# Patient Record
Sex: MALE | Race: WHITE | HISPANIC OR LATINO | Employment: FULL TIME | URBAN - METROPOLITAN AREA
[De-identification: names, ages, dates, MRNs, and addresses within clinical notes are randomized per-mention and may not be internally consistent; named-entity substitution may affect disease eponyms.]

---

## 2017-10-23 ENCOUNTER — TELEPHONE (OUTPATIENT)
Dept: MEDICAL GROUP | Facility: PHYSICIAN GROUP | Age: 47
End: 2017-10-23

## 2017-10-23 NOTE — TELEPHONE ENCOUNTER
ESTABLISHED PATIENT PRE-VISIT PLANNING     Note: Patient will not be contacted if there is no indication to call.     1.  Reviewed notes from the last few office visits within the medical group: Yes    2.  If any orders were placed at last visit or intended to be done for this visit (i.e. 6 mos follow-up), do we have Results/Consult Notes?        •  Labs - Labs ordered, NOT completed. Patient advised to complete prior to next appointment.   Note: If patient appointment is for lab review and patient did not complete labs,                check with provider if OK to reschedule patient until labs completed.       •  Imaging - Imaging was not ordered at last office visit.       •  Referrals - No referrals were ordered at last office visit.    3. Is this appointment scheduled as a Hospital Follow-Up? No    4.  Immunizations were updated in [facility] using WebIZ?: Yes       •  Web Iz Recommendations: FLU, MMR , TD and VARICELLA (Chicken Pox)     5.  Patient is due for the following Health Maintenance Topics:   Health Maintenance Due   Topic Date Due   • IMM INFLUENZA (1) 09/01/2017       - Patient has completed FLU and TDAP Immunization(s) per WebIZ. Chart has been updated.      6.  Patient was NOT informed to arrive 15 min prior to their scheduled appointment and bring in their medication bottles.

## 2017-10-24 ENCOUNTER — OFFICE VISIT (OUTPATIENT)
Dept: MEDICAL GROUP | Facility: PHYSICIAN GROUP | Age: 47
End: 2017-10-24
Payer: COMMERCIAL

## 2017-10-24 ENCOUNTER — HOSPITAL ENCOUNTER (OUTPATIENT)
Dept: LAB | Facility: MEDICAL CENTER | Age: 47
End: 2017-10-24
Attending: FAMILY MEDICINE
Payer: COMMERCIAL

## 2017-10-24 VITALS
TEMPERATURE: 97.8 F | WEIGHT: 230.16 LBS | HEART RATE: 54 BPM | BODY MASS INDEX: 32.95 KG/M2 | HEIGHT: 70 IN | DIASTOLIC BLOOD PRESSURE: 70 MMHG | SYSTOLIC BLOOD PRESSURE: 90 MMHG | OXYGEN SATURATION: 98 %

## 2017-10-24 DIAGNOSIS — E78.5 DYSLIPIDEMIA: ICD-10-CM

## 2017-10-24 DIAGNOSIS — R73.01 IMPAIRED FASTING BLOOD SUGAR: ICD-10-CM

## 2017-10-24 DIAGNOSIS — E66.9 OBESITY (BMI 30-39.9): ICD-10-CM

## 2017-10-24 DIAGNOSIS — Z00.00 ROUTINE PHYSICAL EXAMINATION: ICD-10-CM

## 2017-10-24 DIAGNOSIS — Z23 NEED FOR IMMUNIZATION AGAINST INFLUENZA: ICD-10-CM

## 2017-10-24 LAB
ALBUMIN SERPL BCP-MCNC: 4.2 G/DL (ref 3.2–4.9)
ALBUMIN/GLOB SERPL: 1.6 G/DL
ALP SERPL-CCNC: 50 U/L (ref 30–99)
ALT SERPL-CCNC: 19 U/L (ref 2–50)
ANION GAP SERPL CALC-SCNC: 7 MMOL/L (ref 0–11.9)
AST SERPL-CCNC: 18 U/L (ref 12–45)
BASOPHILS # BLD AUTO: 0.9 % (ref 0–1.8)
BASOPHILS # BLD: 0.06 K/UL (ref 0–0.12)
BILIRUB SERPL-MCNC: 0.6 MG/DL (ref 0.1–1.5)
BUN SERPL-MCNC: 15 MG/DL (ref 8–22)
CALCIUM SERPL-MCNC: 9.8 MG/DL (ref 8.5–10.5)
CHLORIDE SERPL-SCNC: 105 MMOL/L (ref 96–112)
CHOLEST SERPL-MCNC: 177 MG/DL (ref 100–199)
CO2 SERPL-SCNC: 26 MMOL/L (ref 20–33)
CREAT SERPL-MCNC: 0.98 MG/DL (ref 0.5–1.4)
EOSINOPHIL # BLD AUTO: 0.15 K/UL (ref 0–0.51)
EOSINOPHIL NFR BLD: 2.3 % (ref 0–6.9)
ERYTHROCYTE [DISTWIDTH] IN BLOOD BY AUTOMATED COUNT: 43 FL (ref 35.9–50)
EST. AVERAGE GLUCOSE BLD GHB EST-MCNC: 123 MG/DL
GFR SERPL CREATININE-BSD FRML MDRD: >60 ML/MIN/1.73 M 2
GLOBULIN SER CALC-MCNC: 2.7 G/DL (ref 1.9–3.5)
GLUCOSE SERPL-MCNC: 108 MG/DL (ref 65–99)
HBA1C MFR BLD: 5.9 % (ref 0–5.6)
HCT VFR BLD AUTO: 47 % (ref 42–52)
HDLC SERPL-MCNC: 38 MG/DL
HGB BLD-MCNC: 15.6 G/DL (ref 14–18)
IMM GRANULOCYTES # BLD AUTO: 0.03 K/UL (ref 0–0.11)
IMM GRANULOCYTES NFR BLD AUTO: 0.5 % (ref 0–0.9)
LDLC SERPL CALC-MCNC: 97 MG/DL
LYMPHOCYTES # BLD AUTO: 1.97 K/UL (ref 1–4.8)
LYMPHOCYTES NFR BLD: 29.7 % (ref 22–41)
MCH RBC QN AUTO: 29 PG (ref 27–33)
MCHC RBC AUTO-ENTMCNC: 33.2 G/DL (ref 33.7–35.3)
MCV RBC AUTO: 87.4 FL (ref 81.4–97.8)
MONOCYTES # BLD AUTO: 0.58 K/UL (ref 0–0.85)
MONOCYTES NFR BLD AUTO: 8.7 % (ref 0–13.4)
NEUTROPHILS # BLD AUTO: 3.84 K/UL (ref 1.82–7.42)
NEUTROPHILS NFR BLD: 57.9 % (ref 44–72)
NRBC # BLD AUTO: 0 K/UL
NRBC BLD AUTO-RTO: 0 /100 WBC
PLATELET # BLD AUTO: 292 K/UL (ref 164–446)
PMV BLD AUTO: 10.9 FL (ref 9–12.9)
POTASSIUM SERPL-SCNC: 4.3 MMOL/L (ref 3.6–5.5)
PROT SERPL-MCNC: 6.9 G/DL (ref 6–8.2)
RBC # BLD AUTO: 5.38 M/UL (ref 4.7–6.1)
SODIUM SERPL-SCNC: 138 MMOL/L (ref 135–145)
TRIGL SERPL-MCNC: 208 MG/DL (ref 0–149)
WBC # BLD AUTO: 6.6 K/UL (ref 4.8–10.8)

## 2017-10-24 PROCEDURE — 85025 COMPLETE CBC W/AUTO DIFF WBC: CPT

## 2017-10-24 PROCEDURE — 90471 IMMUNIZATION ADMIN: CPT | Performed by: FAMILY MEDICINE

## 2017-10-24 PROCEDURE — 80053 COMPREHEN METABOLIC PANEL: CPT

## 2017-10-24 PROCEDURE — 90686 IIV4 VACC NO PRSV 0.5 ML IM: CPT | Performed by: FAMILY MEDICINE

## 2017-10-24 PROCEDURE — 80061 LIPID PANEL: CPT

## 2017-10-24 PROCEDURE — 83036 HEMOGLOBIN GLYCOSYLATED A1C: CPT

## 2017-10-24 PROCEDURE — 99396 PREV VISIT EST AGE 40-64: CPT | Mod: 25 | Performed by: FAMILY MEDICINE

## 2017-10-24 PROCEDURE — 36415 COLL VENOUS BLD VENIPUNCTURE: CPT

## 2017-10-24 ASSESSMENT — PATIENT HEALTH QUESTIONNAIRE - PHQ9: CLINICAL INTERPRETATION OF PHQ2 SCORE: 0

## 2017-10-24 NOTE — PROGRESS NOTES
Subjective:      Elder Starks is a 47 y.o. male who presents with Annual Exam            HPI     Patient is here for annual physical exam. He already had a tdap in 9/16. He needs a flu shot today.    21 had his blood work done September 2016, he had borderline elevation of blood sugar and lipid panel came back triglycerides were slightly high at 212, HDL was slightly low at 37 and LDL at 83. He was supposed to have a follow-up blood work 3 months after that to see if this improved with his efforts which he has not done yet. Patient has gained weight about 5 pounds since last visit.    I reviewed the following    Past Medical History:   Diagnosis Date   • No significant past medical history         History reviewed. No pertinent surgical history.    No Known Allergies    Current Outpatient Prescriptions   Medication Sig Dispense Refill   • metronidazole (FLAGYL) 500 MG Tab Take 1 Tab by mouth 3 times a day. 30 Tab 0   • azithromycin (ZITHROMAX) 250 MG Tab Take 2 tablets by mouth on day one. Take one tablet by mouth the remaining days until gone 6 Tab 0   • cetirizine (ZYRTEC ALLERGY) 10 MG Tab Take 1 Tab by mouth every day. 30 Tab 0   • fluticasone (FLONASE) 50 MCG/ACT nasal spray Spray 1 Spray in nose every day. 16 g 0     No current facility-administered medications for this visit.         Family History   Problem Relation Age of Onset   • No Known Problems Mother    • Other Father    • No Known Problems Sister    • No Known Problems Brother    • No Known Problems Brother        Social History     Social History   • Marital status:      Spouse name: N/A   • Number of children: 2   • Years of education: N/A     Occupational History   • property management  Unknown     Social History Main Topics   • Smoking status: Former Smoker     Packs/day: 0.10     Years: 4.00     Types: Cigarettes   • Smokeless tobacco: Never Used      Comment: quit cigarettes at age 22   • Alcohol use 0.0 oz/week      Comment: occasional  "  • Drug use: No   • Sexual activity: Yes     Partners: Female     Other Topics Concern   • Not on file     Social History Narrative   • No narrative on file        ROS     Review of systems as per history of present illness, the rest are negative.       Objective:     BP (!) 90/70   Pulse (!) 54   Temp 36.6 °C (97.8 °F)   Ht 1.778 m (5' 10\")   Wt 104.4 kg (230 lb 2.6 oz)   SpO2 98%   BMI 33.02 kg/m²      Physical Exam   Constitutional: He is oriented to person, place, and time. He appears well-developed and well-nourished. No distress.   HENT:   Head: Normocephalic and atraumatic.   Right Ear: External ear normal.   Left Ear: External ear normal.   Nose: Nose normal.   Mouth/Throat: Oropharynx is clear and moist. No oropharyngeal exudate.   Eyes: Conjunctivae and EOM are normal. Pupils are equal, round, and reactive to light. Right eye exhibits no discharge. Left eye exhibits no discharge. No scleral icterus.   Neck: Normal range of motion. Neck supple. No JVD present. No tracheal deviation present. No thyromegaly present.   Cardiovascular: Normal rate, regular rhythm, normal heart sounds and intact distal pulses.  Exam reveals no gallop and no friction rub.    No murmur heard.  Pulmonary/Chest: Effort normal. No stridor. No respiratory distress. He has no wheezes. He has no rales. He exhibits no tenderness.   Abdominal: Soft. Bowel sounds are normal. He exhibits no distension and no mass. There is no tenderness. There is no rebound and no guarding. No hernia. Hernia confirmed negative in the right inguinal area.   Genitourinary: Testes normal and penis normal. Right testis shows no mass, no swelling and no tenderness. Right testis is descended. Left testis shows no mass, no swelling and no tenderness. Left testis is descended. Uncircumcised. No phimosis, paraphimosis, hypospadias, penile erythema or penile tenderness. No discharge found.   Musculoskeletal: Normal range of motion. He exhibits no edema or " tenderness.   Lymphadenopathy:     He has no cervical adenopathy. No inguinal adenopathy noted on the right side.   Neurological: He is alert and oriented to person, place, and time. He has normal reflexes. He displays normal reflexes. No cranial nerve deficit. He exhibits normal muscle tone. Coordination normal.   Skin: Skin is warm. No rash noted. He is not diaphoretic. No erythema. No pallor.   Psychiatric: He has a normal mood and affect. His behavior is normal. Judgment and thought content normal.                    Assessment/Plan:     1. Routine physical examination  Complete exam was done. Up-to-date with tdap. Flu shot was given.  - LIPID PROFILE; Future  - COMP METABOLIC PANEL; Future  - HEMOGLOBIN A1C; Future  - CBC WITH DIFFERENTIAL; Future    2. Impaired fasting blood sugar  We will do follow-up blood sugar including hemoglobin A1c. Advised to watch the sweets and decreased carbs, exercise regularly and lose weight.  - LIPID PROFILE; Future  - COMP METABOLIC PANEL; Future  - HEMOGLOBIN A1C; Future  - CBC WITH DIFFERENTIAL; Future    3. Dyslipidemia  We will do follow-up lipid panel. Advised to work on watching the carbohydrates and sweets and increasing the activity would regular exercises, losing weight.  - LIPID PROFILE; Future  - COMP METABOLIC PANEL; Future  - HEMOGLOBIN A1C; Future  - CBC WITH DIFFERENTIAL; Future    4. Obesity (BMI 30-39.9)  We discussed diet, exercise and weight loss.  - Patient identified as having weight management issue.  Appropriate orders and counseling given.  - LIPID PROFILE; Future  - COMP METABOLIC PANEL; Future  - HEMOGLOBIN A1C; Future  - CBC WITH DIFFERENTIAL; Future    5. Need for immunization against influenza  Flu shot was given.  - INFLUENZA VACCINE QUAD INJ >3Y(PF)      Please note that this dictation was created using voice recognition software. I have worked with consultants from the vendor as well as technical experts from [employer] to optimize the  interface. I have made every reasonable attempt to correct obvious errors, but I expect that there are errors of grammar and possibly content I did not discover before finalizing the note.

## 2017-10-26 ENCOUNTER — TELEPHONE (OUTPATIENT)
Dept: MEDICAL GROUP | Facility: PHYSICIAN GROUP | Age: 47
End: 2017-10-26

## 2017-10-26 NOTE — TELEPHONE ENCOUNTER
----- Message from Nova Gifford M.D. sent at 10/25/2017  7:05 PM PDT -----  No anemia. Blood sugar borderline elevated 108 and needs to be below 100. He has increased risk for diabetes so he needs to avoid sweets and decrease the carbohydrates, exercise regularly and maintain a healthy weight.  Liver and kidney functions are normal. Cholesterol panel came back triglycerides are high at 208. Triglycerides are from carbohydrates and sweets so he needs to avoid sweets and decreased carbs. HDL or good cholesterol is still low at 38. This needs to be 40 or higher. Exercise will make this better. Try to have cardio exercises 30 minutes a day for 5 days a week. LDL or bad cholesterol at target at 97. His 10 year risk of having heart disease is low at 0.6%. We treat with cholesterol medication if 7.5% or higher therefore we don't need to put him on medication. He just needs to work on the diet, exercise and weight loss.

## 2017-10-26 NOTE — TELEPHONE ENCOUNTER
Phone Number Called: [phone] (home) [phone] (work)    Message: Unable to reach pt left vm to call back.     Left Message for patient to call back: yes

## 2018-08-06 ENCOUNTER — HOSPITAL ENCOUNTER (OUTPATIENT)
Dept: RADIOLOGY | Facility: MEDICAL CENTER | Age: 48
End: 2018-08-06
Attending: FAMILY MEDICINE
Payer: COMMERCIAL

## 2018-08-06 ENCOUNTER — OFFICE VISIT (OUTPATIENT)
Dept: URGENT CARE | Facility: PHYSICIAN GROUP | Age: 48
End: 2018-08-06
Payer: COMMERCIAL

## 2018-08-06 VITALS
TEMPERATURE: 99.1 F | OXYGEN SATURATION: 97 % | WEIGHT: 232 LBS | SYSTOLIC BLOOD PRESSURE: 122 MMHG | BODY MASS INDEX: 32.48 KG/M2 | HEIGHT: 71 IN | DIASTOLIC BLOOD PRESSURE: 78 MMHG | HEART RATE: 93 BPM | RESPIRATION RATE: 16 BRPM

## 2018-08-06 DIAGNOSIS — S91.331A PUNCTURE WOUND OF RIGHT FOOT, INITIAL ENCOUNTER: ICD-10-CM

## 2018-08-06 DIAGNOSIS — S92.314B: ICD-10-CM

## 2018-08-06 PROCEDURE — 99213 OFFICE O/P EST LOW 20 MIN: CPT | Performed by: FAMILY MEDICINE

## 2018-08-06 PROCEDURE — 73630 X-RAY EXAM OF FOOT: CPT | Mod: RT

## 2018-08-06 RX ORDER — CEPHALEXIN 500 MG/1
500 CAPSULE ORAL 4 TIMES DAILY
Qty: 28 CAP | Refills: 0 | Status: SHIPPED | OUTPATIENT
Start: 2018-08-06 | End: 2018-08-13

## 2018-08-08 ASSESSMENT — ENCOUNTER SYMPTOMS: BRUISES/BLEEDS EASILY: 0

## 2018-08-08 NOTE — PROGRESS NOTES
"Subjective:      Elder Starks is a 48 y.o. male who presents with Foot Pain (pt shot himself with a nail gun today in his R foot)            Onset today puncture wound right foot.  He has had a 3 inch nail near his first MTP joint angled slightly backward midfoot.  Nail slightly breach the skin on the plantar aspect.  He pulled the nail out intact.  Nail went through a leather boot.  Pain is moderate.  No active bleeding.  No function or sensory loss.  He is not taking anything for pain.  It does hurt worse with weightbearing.  No other aggravating or alleviating factors.  Tetanus is up-to-date.        Review of Systems   Musculoskeletal: Negative for joint pain.   Skin: Negative for itching and rash.   Endo/Heme/Allergies: Does not bruise/bleed easily.          Objective:     /78   Pulse 93   Temp 37.3 °C (99.1 °F)   Resp 16   Ht 1.803 m (5' 11\")   Wt 105.2 kg (232 lb)   SpO2 97%   BMI 32.36 kg/m²      Physical Exam   Constitutional: He appears well-developed and well-nourished. No distress.   HENT:   Head: Normocephalic and atraumatic.   Musculoskeletal:        Feet:                Assessment/Plan:   X-ray right foot per radiology:  1.  Fracture in the lateral aspect of the first distal metatarsal. No definite extension to the first MTP joint is appreciated.    2.  Radiopaque density projecting lateral to the distal first metatarsal is consistent with a foreign body.    3.  Moderate degenerative change in the first MTP joint.    1. Puncture wound of right foot, initial encounter    - DX-FOOT-COMPLETE 3+ RIGHT; Future    2. Open nondisplaced fracture of first metatarsal bone of right foot, initial encounter    - cephALEXin (KEFLEX) 500 MG Cap; Take 1 Cap by mouth 4 times a day for 7 days.  Dispense: 28 Cap; Refill: 0  - REFERRAL TO ORTHOPEDICS    Differential diagnosis, natural history, supportive care, and indications for immediate follow-up discussed at length.     Case was discussed with on-call " orthopedist Dr. Avina.  He will likely leave the metallic foreign body in the foot.  He asked that we immobilize and have the patient follow-up in clinic with him the next day.  Keflex initiated.

## 2018-10-17 DIAGNOSIS — R73.01 IMPAIRED FASTING BLOOD SUGAR: ICD-10-CM

## 2018-10-17 DIAGNOSIS — E78.5 DYSLIPIDEMIA: ICD-10-CM

## 2018-10-27 ENCOUNTER — HOSPITAL ENCOUNTER (OUTPATIENT)
Dept: LAB | Facility: MEDICAL CENTER | Age: 48
End: 2018-10-27
Attending: FAMILY MEDICINE
Payer: COMMERCIAL

## 2018-10-27 DIAGNOSIS — E78.5 DYSLIPIDEMIA: ICD-10-CM

## 2018-10-27 DIAGNOSIS — R73.01 IMPAIRED FASTING BLOOD SUGAR: ICD-10-CM

## 2018-10-27 LAB
ALBUMIN SERPL BCP-MCNC: 4.5 G/DL (ref 3.2–4.9)
ALBUMIN/GLOB SERPL: 1.6 G/DL
ALP SERPL-CCNC: 54 U/L (ref 30–99)
ALT SERPL-CCNC: 21 U/L (ref 2–50)
ANION GAP SERPL CALC-SCNC: 7 MMOL/L (ref 0–11.9)
AST SERPL-CCNC: 21 U/L (ref 12–45)
BASOPHILS # BLD AUTO: 0.7 % (ref 0–1.8)
BASOPHILS # BLD: 0.04 K/UL (ref 0–0.12)
BILIRUB SERPL-MCNC: 0.5 MG/DL (ref 0.1–1.5)
BUN SERPL-MCNC: 18 MG/DL (ref 8–22)
CALCIUM SERPL-MCNC: 9.8 MG/DL (ref 8.5–10.5)
CHLORIDE SERPL-SCNC: 107 MMOL/L (ref 96–112)
CHOLEST SERPL-MCNC: 169 MG/DL (ref 100–199)
CO2 SERPL-SCNC: 26 MMOL/L (ref 20–33)
CREAT SERPL-MCNC: 1.14 MG/DL (ref 0.5–1.4)
EOSINOPHIL # BLD AUTO: 0.15 K/UL (ref 0–0.51)
EOSINOPHIL NFR BLD: 2.6 % (ref 0–6.9)
ERYTHROCYTE [DISTWIDTH] IN BLOOD BY AUTOMATED COUNT: 44.7 FL (ref 35.9–50)
EST. AVERAGE GLUCOSE BLD GHB EST-MCNC: 134 MG/DL
GLOBULIN SER CALC-MCNC: 2.9 G/DL (ref 1.9–3.5)
GLUCOSE SERPL-MCNC: 114 MG/DL (ref 65–99)
HBA1C MFR BLD: 6.3 % (ref 0–5.6)
HCT VFR BLD AUTO: 48.4 % (ref 42–52)
HDLC SERPL-MCNC: 35 MG/DL
HGB BLD-MCNC: 15.9 G/DL (ref 14–18)
IMM GRANULOCYTES # BLD AUTO: 0.04 K/UL (ref 0–0.11)
IMM GRANULOCYTES NFR BLD AUTO: 0.7 % (ref 0–0.9)
LDLC SERPL CALC-MCNC: 94 MG/DL
LYMPHOCYTES # BLD AUTO: 1.9 K/UL (ref 1–4.8)
LYMPHOCYTES NFR BLD: 32.6 % (ref 22–41)
MCH RBC QN AUTO: 28.6 PG (ref 27–33)
MCHC RBC AUTO-ENTMCNC: 32.9 G/DL (ref 33.7–35.3)
MCV RBC AUTO: 87.1 FL (ref 81.4–97.8)
MONOCYTES # BLD AUTO: 0.41 K/UL (ref 0–0.85)
MONOCYTES NFR BLD AUTO: 7 % (ref 0–13.4)
NEUTROPHILS # BLD AUTO: 3.29 K/UL (ref 1.82–7.42)
NEUTROPHILS NFR BLD: 56.4 % (ref 44–72)
NRBC # BLD AUTO: 0 K/UL
NRBC BLD-RTO: 0 /100 WBC
PLATELET # BLD AUTO: 279 K/UL (ref 164–446)
PMV BLD AUTO: 10.5 FL (ref 9–12.9)
POTASSIUM SERPL-SCNC: 4.2 MMOL/L (ref 3.6–5.5)
PROT SERPL-MCNC: 7.4 G/DL (ref 6–8.2)
RBC # BLD AUTO: 5.56 M/UL (ref 4.7–6.1)
SODIUM SERPL-SCNC: 140 MMOL/L (ref 135–145)
TRIGL SERPL-MCNC: 202 MG/DL (ref 0–149)
WBC # BLD AUTO: 5.8 K/UL (ref 4.8–10.8)

## 2018-10-27 PROCEDURE — 80061 LIPID PANEL: CPT

## 2018-10-27 PROCEDURE — 83036 HEMOGLOBIN GLYCOSYLATED A1C: CPT

## 2018-10-27 PROCEDURE — 85025 COMPLETE CBC W/AUTO DIFF WBC: CPT

## 2018-10-27 PROCEDURE — 36415 COLL VENOUS BLD VENIPUNCTURE: CPT

## 2018-10-27 PROCEDURE — 80053 COMPREHEN METABOLIC PANEL: CPT

## 2018-10-29 ENCOUNTER — OFFICE VISIT (OUTPATIENT)
Dept: MEDICAL GROUP | Facility: PHYSICIAN GROUP | Age: 48
End: 2018-10-29
Payer: COMMERCIAL

## 2018-10-29 VITALS
WEIGHT: 232.59 LBS | OXYGEN SATURATION: 96 % | DIASTOLIC BLOOD PRESSURE: 80 MMHG | HEART RATE: 60 BPM | HEIGHT: 70 IN | TEMPERATURE: 97.6 F | BODY MASS INDEX: 33.3 KG/M2 | SYSTOLIC BLOOD PRESSURE: 110 MMHG

## 2018-10-29 DIAGNOSIS — E78.5 DYSLIPIDEMIA: ICD-10-CM

## 2018-10-29 DIAGNOSIS — R73.01 IMPAIRED FASTING BLOOD SUGAR: ICD-10-CM

## 2018-10-29 DIAGNOSIS — Z00.00 ROUTINE PHYSICAL EXAMINATION: ICD-10-CM

## 2018-10-29 PROCEDURE — 99396 PREV VISIT EST AGE 40-64: CPT | Performed by: FAMILY MEDICINE

## 2018-10-29 ASSESSMENT — PATIENT HEALTH QUESTIONNAIRE - PHQ9: CLINICAL INTERPRETATION OF PHQ2 SCORE: 0

## 2018-10-29 NOTE — PROGRESS NOTES
"Subjective:      Elder Starks is a 48 y.o. male who presents with Annual Exam            HPI     Patient is here for annual physical exam.  He is accompanied by his wife.  He also needs follow-up of dyslipidemia and impaired fasting blood sugar.    He has been trying to manage his dyslipidemia and impaired fasting blood sugar with his own efforts but he is not good in watching his diet.  His weight increased 3 pounds from the last visit a year ago.  He is not exercising regularly.    I reviewed the following    Past Medical History:   Diagnosis Date   • No significant past medical history         History reviewed. No pertinent surgical history.    No Known Allergies    No current outpatient prescriptions on file.     No current facility-administered medications for this visit.         Family History   Problem Relation Age of Onset   • No Known Problems Mother    • Other Father    • No Known Problems Sister    • No Known Problems Brother    • No Known Problems Brother        Social History     Social History   • Marital status:      Spouse name: N/A   • Number of children: 2   • Years of education: N/A     Occupational History   • property management  Unknown     Social History Main Topics   • Smoking status: Former Smoker     Packs/day: 0.10     Years: 4.00     Types: Cigarettes   • Smokeless tobacco: Never Used      Comment: quit cigarettes at age 22   • Alcohol use 0.0 oz/week      Comment: occasional   • Drug use: No   • Sexual activity: Yes     Partners: Female     Other Topics Concern   • Not on file     Social History Narrative   • No narrative on file        ROS     As per HPI, the rest are negative.       Objective:     /80 (BP Location: Left arm, Patient Position: Sitting, BP Cuff Size: Adult)   Pulse 60   Temp 36.4 °C (97.6 °F) (Temporal)   Ht 1.778 m (5' 10\")   Wt 105.5 kg (232 lb 9.4 oz)   SpO2 96%   BMI 33.37 kg/m²      Physical Exam   Constitutional: He is oriented to person, place, and " time. He appears well-developed and well-nourished. No distress.   HENT:   Head: Normocephalic and atraumatic.   Right Ear: External ear normal.   Left Ear: External ear normal.   Nose: Nose normal.   Mouth/Throat: Oropharynx is clear and moist. No oropharyngeal exudate.   Eyes: Pupils are equal, round, and reactive to light. Conjunctivae and EOM are normal. Right eye exhibits no discharge. Left eye exhibits no discharge. No scleral icterus.   Neck: Normal range of motion. Neck supple. No JVD present. No tracheal deviation present. No thyromegaly present.   Cardiovascular: Normal rate, regular rhythm, normal heart sounds and intact distal pulses.  Exam reveals no gallop and no friction rub.    No murmur heard.  Pulmonary/Chest: Effort normal and breath sounds normal. No stridor. No respiratory distress. He has no wheezes. He has no rales. He exhibits no tenderness.   Abdominal: Soft. Bowel sounds are normal. He exhibits no distension and no mass. There is no tenderness. There is no rebound and no guarding. Hernia confirmed negative in the right inguinal area and confirmed negative in the left inguinal area.   Genitourinary: Testes normal and penis normal. Right testis shows no mass, no swelling and no tenderness. Right testis is descended. Left testis shows no mass, no swelling and no tenderness. Left testis is descended. Uncircumcised. No phimosis, paraphimosis, hypospadias, penile erythema or penile tenderness. No discharge found.   Musculoskeletal: Normal range of motion. He exhibits no edema or tenderness.   Lymphadenopathy:     He has no cervical adenopathy. No inguinal adenopathy noted on the right or left side.        Right: No inguinal adenopathy present.        Left: No inguinal adenopathy present.   Neurological: He is alert and oriented to person, place, and time. He displays normal reflexes. No cranial nerve deficit. He exhibits normal muscle tone. Coordination normal.   Skin: Skin is warm and dry. No  rash noted. He is not diaphoretic. No erythema. No pallor.   Psychiatric: He has a normal mood and affect. His behavior is normal. Judgment and thought content normal.   Nursing note and vitals reviewed.     Results for WYANE TURNER (MRN [id number]) as of 10/29/2018 08:11   Ref. Range 10/24/2017 08:07 10/27/2018 10:15   Sodium Latest Ref Range: 135 - 145 mmol/L 138 140   Potassium Latest Ref Range: 3.6 - 5.5 mmol/L 4.3 4.2   Chloride Latest Ref Range: 96 - 112 mmol/L 105 107   Co2 Latest Ref Range: 20 - 33 mmol/L 26 26   Anion Gap Latest Ref Range: 0.0 - 11.9  7.0 7.0   Glucose Latest Ref Range: 65 - 99 mg/dL 108 (H) 114 (H)   Bun Latest Ref Range: 8 - 22 mg/dL 15 18   Creatinine Latest Ref Range: 0.50 - 1.40 mg/dL 0.98 1.14   GFR If  Latest Ref Range: >60 mL/min/1.73 m 2 >60 >60   GFR If Non  Latest Ref Range: >60 mL/min/1.73 m 2 >60 >60   Calcium Latest Ref Range: 8.5 - 10.5 mg/dL 9.8 9.8   AST(SGOT) Latest Ref Range: 12 - 45 U/L 18 21   ALT(SGPT) Latest Ref Range: 2 - 50 U/L 19 21   Alkaline Phosphatase Latest Ref Range: 30 - 99 U/L 50 54   Total Bilirubin Latest Ref Range: 0.1 - 1.5 mg/dL 0.6 0.5   Albumin Latest Ref Range: 3.2 - 4.9 g/dL 4.2 4.5   Total Protein Latest Ref Range: 6.0 - 8.2 g/dL 6.9 7.4   Globulin Latest Ref Range: 1.9 - 3.5 g/dL 2.7 2.9   A-G Ratio Latest Units: g/dL 1.6 1.6   Glycohemoglobin Latest Ref Range: 0.0 - 5.6 % 5.9 (H) 6.3 (H)   Estim. Avg Glu Latest Units: mg/dL 123 134   Cholesterol,Tot Latest Ref Range: 100 - 199 mg/dL 177 169   Triglycerides Latest Ref Range: 0 - 149 mg/dL 208 (H) 202 (H)   HDL Latest Ref Range: >=40 mg/dL 38 (A) 35 (A)   LDL Latest Ref Range: <100 mg/dL 97 94     Results for WAYNE TURNER (MRN [id number]) as of 10/29/2018 08:11   Ref. Range 10/24/2017 08:07 10/27/2018 10:15   WBC Latest Ref Range: 4.8 - 10.8 K/uL 6.6 5.8   RBC Latest Ref Range: 4.70 - 6.10 M/uL 5.38 5.56   Hemoglobin Latest Ref Range: 14.0 - 18.0 g/dL 15.6 15.9   Hematocrit  Latest Ref Range: 42.0 - 52.0 % 47.0 48.4   MCV Latest Ref Range: 81.4 - 97.8 fL 87.4 87.1   MCH Latest Ref Range: 27.0 - 33.0 pg 29.0 28.6   MCHC Latest Ref Range: 33.7 - 35.3 g/dL 33.2 (L) 32.9 (L)   RDW Latest Ref Range: 35.9 - 50.0 fL 43.0 44.7   Platelet Count Latest Ref Range: 164 - 446 K/uL 292 279   MPV Latest Ref Range: 9.0 - 12.9 fL 10.9 10.5   Neutrophils-Polys Latest Ref Range: 44.00 - 72.00 % 57.90 56.40   Neutrophils (Absolute) Latest Ref Range: 1.82 - 7.42 K/uL 3.84 3.29   Lymphocytes Latest Ref Range: 22.00 - 41.00 % 29.70 32.60   Lymphs (Absolute) Latest Ref Range: 1.00 - 4.80 K/uL 1.97 1.90   Monocytes Latest Ref Range: 0.00 - 13.40 % 8.70 7.00   Monos (Absolute) Latest Ref Range: 0.00 - 0.85 K/uL 0.58 0.41   Eosinophils Latest Ref Range: 0.00 - 6.90 % 2.30 2.60   Eos (Absolute) Latest Ref Range: 0.00 - 0.51 K/uL 0.15 0.15   Basophils Latest Ref Range: 0.00 - 1.80 % 0.90 0.70   Baso (Absolute) Latest Ref Range: 0.00 - 0.12 K/uL 0.06 0.04   Immature Granulocytes Latest Ref Range: 0.00 - 0.90 % 0.50 0.70   Immature Granulocytes (abs) Latest Ref Range: 0.00 - 0.11 K/uL 0.03 0.04   Nucleated RBC Latest Units: /100 WBC 0.00 0.00   NRBC (Absolute) Latest Units: K/uL 0.00 0.00               Assessment/Plan:     1. Routine physical examination  Complete exam was done.  Up-to-date with Tdap which was given in 9/16.  We are out of the flu shot so he will get it from a local drugstore.    2. Impaired fasting blood sugar  Borderline elevation of blood sugar but nondiabetic level.  He will work hard on avoidance of sweets, decreasing the carbs, regular exercise and weight loss.  We will follow-up in 6 months.  - HEMOGLOBIN A1C; Future  - LIPID PROFILE; Future  - COMP METABOLIC PANEL; Future    3. Dyslipidemia  10-year ASCVD risk is 2.3%.  Triglycerides are still slightly elevated and HDL is still low below 40.  Advised to avoid sweets, decrease the carbs, exercise regularly.  Recheck in 6 months.  - HEMOGLOBIN  A1C; Future  - LIPID PROFILE; Future  - COMP METABOLIC PANEL; Future      Please note that this dictation was created using voice recognition software. I have worked with consultants from the vendor as well as technical experts from [facility] to optimize the interface. I have made every reasonable attempt to correct obvious errors, but I expect that there are errors of grammar and possibly content I did not discover before finalizing the note.

## 2019-07-18 ENCOUNTER — OFFICE VISIT (OUTPATIENT)
Dept: URGENT CARE | Facility: PHYSICIAN GROUP | Age: 49
End: 2019-07-18
Payer: COMMERCIAL

## 2019-07-18 ENCOUNTER — HOSPITAL ENCOUNTER (OUTPATIENT)
Dept: RADIOLOGY | Facility: MEDICAL CENTER | Age: 49
End: 2019-07-18
Attending: NURSE PRACTITIONER
Payer: COMMERCIAL

## 2019-07-18 VITALS
OXYGEN SATURATION: 95 % | DIASTOLIC BLOOD PRESSURE: 78 MMHG | WEIGHT: 229 LBS | TEMPERATURE: 98.6 F | HEART RATE: 90 BPM | HEIGHT: 70 IN | SYSTOLIC BLOOD PRESSURE: 104 MMHG | RESPIRATION RATE: 16 BRPM | BODY MASS INDEX: 32.78 KG/M2

## 2019-07-18 DIAGNOSIS — S39.012A STRAIN OF LUMBAR REGION, INITIAL ENCOUNTER: ICD-10-CM

## 2019-07-18 DIAGNOSIS — M54.50 ACUTE BILATERAL LOW BACK PAIN WITHOUT SCIATICA: ICD-10-CM

## 2019-07-18 PROCEDURE — 72100 X-RAY EXAM L-S SPINE 2/3 VWS: CPT

## 2019-07-18 PROCEDURE — 99214 OFFICE O/P EST MOD 30 MIN: CPT | Performed by: NURSE PRACTITIONER

## 2019-07-18 RX ORDER — IBUPROFEN 800 MG/1
800 TABLET ORAL EVERY 8 HOURS PRN
Qty: 30 TAB | Refills: 0 | Status: SHIPPED | OUTPATIENT
Start: 2019-07-18 | End: 2019-09-09

## 2019-07-18 RX ORDER — CYCLOBENZAPRINE HCL 10 MG
10 TABLET ORAL 3 TIMES DAILY PRN
Qty: 30 TAB | Refills: 0 | Status: SHIPPED | OUTPATIENT
Start: 2019-07-18 | End: 2019-10-30

## 2019-07-21 ASSESSMENT — ENCOUNTER SYMPTOMS
PARESIS: 0
NEUROLOGICAL NEGATIVE: 1
WEAKNESS: 0
BOWEL INCONTINENCE: 0
RESPIRATORY NEGATIVE: 1
NUMBNESS: 0
FOCAL WEAKNESS: 0
CONSTITUTIONAL NEGATIVE: 1
PARESTHESIAS: 0
BACK PAIN: 1
TINGLING: 0
SENSORY CHANGE: 0

## 2019-07-22 NOTE — PROGRESS NOTES
Subjective:     Elder Starks is a 49 y.o. male who presents for Back Pain (low back pain, x3 days)       Back Pain   This is a new problem. Episode onset: 3 days ago. The problem has been gradually worsening since onset. The pain is present in the lumbar spine. The pain is moderate. The symptoms are aggravated by bending. Pertinent negatives include no bladder incontinence, bowel incontinence, numbness, paresis, paresthesias, tingling or weakness. Risk factors: recent heavy lifting.     PMH:  has a past medical history of No significant past medical history. He also has no past medical history of Asthma or Type II or unspecified type diabetes mellitus without mention of complication, not stated as uncontrolled.    MEDS:   Current Outpatient Prescriptions:   •  cyclobenzaprine (FLEXERIL) 10 MG Tab, Take 1 Tab by mouth 3 times a day as needed for Muscle Spasms., Disp: 30 Tab, Rfl: 0  •  ibuprofen (MOTRIN) 800 MG Tab, Take 1 Tab by mouth every 8 hours as needed for Moderate Pain., Disp: 30 Tab, Rfl: 0    ALLERGIES: No Known Allergies    SURGHX: No past surgical history on file.    SOCHX:  reports that he has quit smoking. His smoking use included Cigarettes. He has a 0.40 pack-year smoking history. He has never used smokeless tobacco. He reports that he drinks alcohol. He reports that he does not use drugs.    FH: Reviewed with patient, not pertinent to this visit.    Review of Systems   Constitutional: Negative.  Negative for malaise/fatigue.   Respiratory: Negative.    Gastrointestinal: Negative for bowel incontinence.   Genitourinary: Negative for bladder incontinence.   Musculoskeletal: Positive for back pain.   Neurological: Negative.  Negative for tingling, sensory change, focal weakness, weakness, numbness and paresthesias.   All other systems reviewed and are negative.    Objective:     /78 (BP Location: Left arm, Patient Position: Sitting, BP Cuff Size: Large adult)   Pulse 90   Temp 37 °C (98.6 °F)  "(Temporal)   Resp 16   Ht 1.778 m (5' 10\")   Wt 103.9 kg (229 lb)   SpO2 95%   BMI 32.86 kg/m²     Physical Exam   Constitutional: He is oriented to person, place, and time. He appears well-developed and well-nourished.  Non-toxic appearance. No distress.   HENT:   Right Ear: External ear normal.   Left Ear: External ear normal.   Nose: Nose normal.   Eyes: Conjunctivae and EOM are normal.   Neck: Normal range of motion.   Cardiovascular: Normal rate and normal pulses.    Pulmonary/Chest: Effort normal. No respiratory distress.   Musculoskeletal: He exhibits no deformity.        Lumbar back: He exhibits decreased range of motion (limited by pain), tenderness, pain and spasm. He exhibits no swelling, no deformity and no laceration.   Neurological: He is alert and oriented to person, place, and time. Coordination normal.   Skin: Skin is warm and dry. Capillary refill takes less than 2 seconds.   Psychiatric: He has a normal mood and affect. His behavior is normal.     X-ray of lumbar spine:    Narrative     7/18/2019 7:35 PM    HISTORY/REASON FOR EXAM:  Pulled back muscle    TECHNIQUE/ EXAM DESCRIPTION AND NUMBER OF VIEWS:  3 views of the lumbar spine.    COMPARISON: None.    FINDINGS:  There are 5 nonrib-bearing lumbar vertebra. No compression fracture is identified. There is intervertebral disc space narrowing and endplate spurring at T12/L1 and L1/L2.. There is mild endplate spurring at L5 and L3/L4. There are mild degenerative   changes of the facet joints at L4/5 and at L5/S1. There is a moderate amount of colonic stool. SI joints are symmetric.     Impression     Degenerative changes as above described.    No fracture or subluxation is seen.    Moderate amount of colonic stool.     Reading Provider Reading Date   Tania Owens M.D.   Jul 18, 2019   Signing Provider Signing Date Signing Time   Tania Owens M.D. Jul 18, 2019  7:45 PM        Assessment/Plan:     1. Strain of lumbar region, initial " encounter  - cyclobenzaprine (FLEXERIL) 10 MG Tab; Take 1 Tab by mouth 3 times a day as needed for Muscle Spasms.  Dispense: 30 Tab; Refill: 0    2. Acute bilateral low back pain without sciatica  - DX-LUMBAR SPINE-2 OR 3 VIEWS; Future  - ibuprofen (MOTRIN) 800 MG Tab; Take 1 Tab by mouth every 8 hours as needed for Moderate Pain.  Dispense: 30 Tab; Refill: 0    X-ray of lumbar spine ordered. Radiology report and images reviewed by myself. Degenerative changes. Negative for acute process, no fracture or dislocation.    Advised on gentle massage, stretching, and ROM exercises. Rest and heat application. Rx sent for muscle relaxant and ibuprofen 800 mg tablets.    Advised close monitoring. Return precautions advised.    Patient advised to: Return for 1) Symptoms don't improve or worsen, or go to ER, 2) Follow up with primary care in 7-10 days.    Differential diagnosis, natural history, supportive care, and indications for immediate follow-up discussed. All questions answered. Patient agrees with the plan of care.

## 2019-08-12 ENCOUNTER — APPOINTMENT (OUTPATIENT)
Dept: MEDICAL GROUP | Facility: PHYSICIAN GROUP | Age: 49
End: 2019-08-12
Payer: COMMERCIAL

## 2019-09-09 ENCOUNTER — OFFICE VISIT (OUTPATIENT)
Dept: URGENT CARE | Facility: PHYSICIAN GROUP | Age: 49
End: 2019-09-09
Payer: COMMERCIAL

## 2019-09-09 ENCOUNTER — HOSPITAL ENCOUNTER (OUTPATIENT)
Dept: RADIOLOGY | Facility: MEDICAL CENTER | Age: 49
End: 2019-09-09
Attending: FAMILY MEDICINE
Payer: COMMERCIAL

## 2019-09-09 VITALS
HEART RATE: 68 BPM | OXYGEN SATURATION: 98 % | TEMPERATURE: 98.1 F | BODY MASS INDEX: 32.28 KG/M2 | SYSTOLIC BLOOD PRESSURE: 116 MMHG | WEIGHT: 225 LBS | RESPIRATION RATE: 16 BRPM | DIASTOLIC BLOOD PRESSURE: 82 MMHG

## 2019-09-09 DIAGNOSIS — S20.212A CONTUSION OF RIB ON LEFT SIDE, INITIAL ENCOUNTER: ICD-10-CM

## 2019-09-09 DIAGNOSIS — R07.9 CHEST PAIN, UNSPECIFIED TYPE: ICD-10-CM

## 2019-09-09 PROCEDURE — 99213 OFFICE O/P EST LOW 20 MIN: CPT | Performed by: FAMILY MEDICINE

## 2019-09-09 PROCEDURE — 71101 X-RAY EXAM UNILAT RIBS/CHEST: CPT | Mod: LT

## 2019-09-09 RX ORDER — MELOXICAM 15 MG/1
15 TABLET ORAL DAILY
Qty: 30 TAB | Refills: 0 | Status: SHIPPED | OUTPATIENT
Start: 2019-09-09 | End: 2019-10-30

## 2019-09-09 ASSESSMENT — ENCOUNTER SYMPTOMS
VERTIGO: 0
FEVER: 0
HEADACHES: 0

## 2019-09-10 NOTE — PROGRESS NOTES
Subjective:   Elder Starks is a 49 y.o. male who presents for Rib Pain (fell off ladder today, landded on left side)        Rib Injury   This is a new problem. The current episode started today. The problem occurs constantly. The problem has been unchanged. Pertinent negatives include no fever, headaches or vertigo.     Review of Systems   Constitutional: Negative for fever.   Neurological: Negative for vertigo and headaches.     No Known Allergies   Objective:   /82 (BP Location: Right arm, Patient Position: Sitting, BP Cuff Size: Adult)   Pulse 68   Temp 36.7 °C (98.1 °F) (Temporal)   Resp 16   Wt 102.1 kg (225 lb)   SpO2 98%   BMI 32.28 kg/m²   Physical Exam   Constitutional: He is oriented to person, place, and time. He appears well-developed and well-nourished. No distress.   HENT:   Head: Normocephalic and atraumatic.   Eyes: Pupils are equal, round, and reactive to light. Conjunctivae and EOM are normal.   Cardiovascular: Normal rate and regular rhythm.   No murmur heard.  Pulmonary/Chest: Effort normal and breath sounds normal. No respiratory distress. He exhibits tenderness. He exhibits no crepitus, no deformity and no retraction.   Abdominal: Soft. He exhibits no distension. There is no tenderness.   Neurological: He is alert and oriented to person, place, and time. He has normal reflexes. No sensory deficit.   Skin: Skin is warm and dry.   Psychiatric: He has a normal mood and affect.         Assessment/Plan:   1. Contusion of rib on left side, initial encounter  - [id number] (WITH 1-VIEW CXR) LEFT; Future  - meloxicam (MOBIC) 15 MG tablet; Take 1 Tab by mouth every day.  Dispense: 30 Tab; Refill: 0    Differential diagnosis, natural history, supportive care, and indications for immediate follow-up discussed.

## 2019-10-30 ENCOUNTER — OFFICE VISIT (OUTPATIENT)
Dept: MEDICAL GROUP | Facility: PHYSICIAN GROUP | Age: 49
End: 2019-10-30
Payer: COMMERCIAL

## 2019-10-30 ENCOUNTER — HOSPITAL ENCOUNTER (OUTPATIENT)
Dept: LAB | Facility: MEDICAL CENTER | Age: 49
End: 2019-10-30
Attending: FAMILY MEDICINE
Payer: COMMERCIAL

## 2019-10-30 VITALS
OXYGEN SATURATION: 98 % | WEIGHT: 232.37 LBS | HEIGHT: 70 IN | SYSTOLIC BLOOD PRESSURE: 110 MMHG | DIASTOLIC BLOOD PRESSURE: 60 MMHG | BODY MASS INDEX: 33.27 KG/M2 | TEMPERATURE: 97 F | HEART RATE: 56 BPM

## 2019-10-30 DIAGNOSIS — R73.01 IMPAIRED FASTING BLOOD SUGAR: ICD-10-CM

## 2019-10-30 DIAGNOSIS — Z23 NEED FOR IMMUNIZATION AGAINST INFLUENZA: ICD-10-CM

## 2019-10-30 DIAGNOSIS — E78.5 DYSLIPIDEMIA: ICD-10-CM

## 2019-10-30 DIAGNOSIS — Z00.00 ROUTINE PHYSICAL EXAMINATION: ICD-10-CM

## 2019-10-30 LAB
ALBUMIN SERPL BCP-MCNC: 4.5 G/DL (ref 3.2–4.9)
ALBUMIN/GLOB SERPL: 2 G/DL
ALP SERPL-CCNC: 70 U/L (ref 30–99)
ALT SERPL-CCNC: 16 U/L (ref 2–50)
ANION GAP SERPL CALC-SCNC: 7 MMOL/L (ref 0–11.9)
AST SERPL-CCNC: 15 U/L (ref 12–45)
BASOPHILS # BLD AUTO: 0.8 % (ref 0–1.8)
BASOPHILS # BLD: 0.05 K/UL (ref 0–0.12)
BILIRUB SERPL-MCNC: 0.6 MG/DL (ref 0.1–1.5)
BUN SERPL-MCNC: 18 MG/DL (ref 8–22)
CALCIUM SERPL-MCNC: 8.9 MG/DL (ref 8.5–10.5)
CHLORIDE SERPL-SCNC: 106 MMOL/L (ref 96–112)
CHOLEST SERPL-MCNC: 171 MG/DL (ref 100–199)
CO2 SERPL-SCNC: 27 MMOL/L (ref 20–33)
CREAT SERPL-MCNC: 1.06 MG/DL (ref 0.5–1.4)
EOSINOPHIL # BLD AUTO: 0.18 K/UL (ref 0–0.51)
EOSINOPHIL NFR BLD: 3.1 % (ref 0–6.9)
ERYTHROCYTE [DISTWIDTH] IN BLOOD BY AUTOMATED COUNT: 45.2 FL (ref 35.9–50)
EST. AVERAGE GLUCOSE BLD GHB EST-MCNC: 123 MG/DL
FASTING STATUS PATIENT QL REPORTED: NORMAL
GLOBULIN SER CALC-MCNC: 2.3 G/DL (ref 1.9–3.5)
GLUCOSE SERPL-MCNC: 113 MG/DL (ref 65–99)
HBA1C MFR BLD: 5.9 % (ref 0–5.6)
HCT VFR BLD AUTO: 47.2 % (ref 42–52)
HDLC SERPL-MCNC: 41 MG/DL
HGB BLD-MCNC: 15.1 G/DL (ref 14–18)
IMM GRANULOCYTES # BLD AUTO: 0.04 K/UL (ref 0–0.11)
IMM GRANULOCYTES NFR BLD AUTO: 0.7 % (ref 0–0.9)
LDLC SERPL CALC-MCNC: 95 MG/DL
LYMPHOCYTES # BLD AUTO: 1.64 K/UL (ref 1–4.8)
LYMPHOCYTES NFR BLD: 27.8 % (ref 22–41)
MCH RBC QN AUTO: 28.9 PG (ref 27–33)
MCHC RBC AUTO-ENTMCNC: 32 G/DL (ref 33.7–35.3)
MCV RBC AUTO: 90.4 FL (ref 81.4–97.8)
MONOCYTES # BLD AUTO: 0.5 K/UL (ref 0–0.85)
MONOCYTES NFR BLD AUTO: 8.5 % (ref 0–13.4)
NEUTROPHILS # BLD AUTO: 3.48 K/UL (ref 1.82–7.42)
NEUTROPHILS NFR BLD: 59.1 % (ref 44–72)
NRBC # BLD AUTO: 0 K/UL
NRBC BLD-RTO: 0 /100 WBC
PLATELET # BLD AUTO: 271 K/UL (ref 164–446)
PMV BLD AUTO: 11 FL (ref 9–12.9)
POTASSIUM SERPL-SCNC: 4.5 MMOL/L (ref 3.6–5.5)
PROT SERPL-MCNC: 6.8 G/DL (ref 6–8.2)
RBC # BLD AUTO: 5.22 M/UL (ref 4.7–6.1)
SODIUM SERPL-SCNC: 140 MMOL/L (ref 135–145)
TRIGL SERPL-MCNC: 177 MG/DL (ref 0–149)
WBC # BLD AUTO: 5.9 K/UL (ref 4.8–10.8)

## 2019-10-30 PROCEDURE — 90686 IIV4 VACC NO PRSV 0.5 ML IM: CPT | Performed by: FAMILY MEDICINE

## 2019-10-30 PROCEDURE — 83036 HEMOGLOBIN GLYCOSYLATED A1C: CPT

## 2019-10-30 PROCEDURE — 80053 COMPREHEN METABOLIC PANEL: CPT

## 2019-10-30 PROCEDURE — 80061 LIPID PANEL: CPT

## 2019-10-30 PROCEDURE — 36415 COLL VENOUS BLD VENIPUNCTURE: CPT

## 2019-10-30 PROCEDURE — 90471 IMMUNIZATION ADMIN: CPT | Performed by: FAMILY MEDICINE

## 2019-10-30 PROCEDURE — 85025 COMPLETE CBC W/AUTO DIFF WBC: CPT

## 2019-10-30 PROCEDURE — 99396 PREV VISIT EST AGE 40-64: CPT | Mod: 25 | Performed by: FAMILY MEDICINE

## 2019-10-30 SDOH — HEALTH STABILITY: MENTAL HEALTH: HOW OFTEN DO YOU HAVE 6 OR MORE DRINKS ON ONE OCCASION?: NEVER

## 2019-10-30 SDOH — HEALTH STABILITY: MENTAL HEALTH: HOW MANY STANDARD DRINKS CONTAINING ALCOHOL DO YOU HAVE ON A TYPICAL DAY?: 3 OR 4

## 2019-10-30 SDOH — HEALTH STABILITY: MENTAL HEALTH: HOW OFTEN DO YOU HAVE A DRINK CONTAINING ALCOHOL?: MONTHLY OR LESS

## 2019-10-30 ASSESSMENT — PATIENT HEALTH QUESTIONNAIRE - PHQ9: CLINICAL INTERPRETATION OF PHQ2 SCORE: 0

## 2019-10-30 NOTE — PROGRESS NOTES
Subjective:      Elder Starks is a 49 y.o. male who presents with Annual Exam        HPI:    The patient is here for annual exam and for follow up on his chronic medical problems. Patient would like to get his Influenza shot today. He reports feeling well and has no new medical complaints. No labs were completed prior to this visit but patient agrees to complete them after this visit.  Patient is up-to-date with Tdap which was given in 9/16.    Patient has history of impaired fasting blood sugar which he has been managing with his own efforts.  The last blood work was in October 2018 with hemoglobin A1c of 6.3%.    Patient also has history of anemia with the last lipid panel in October 2018 that came back triglycerides 202, HDL 35 and LDL 94.  Main problems are elevated triglycerides and low HDL LDL has always been below 100.  Managing this with his own efforts.  The last calculated 10-year ASCVD risk was 2.3%.    Wife has noticed a small bump on the upper back which is nonpainful.  This is been there for a few weeks.    I reviewed the following    Past Medical History:   Diagnosis Date   • No significant past medical history         History reviewed. No pertinent surgical history.    No Known Allergies    Current Outpatient Medications   Medication Sig Dispense Refill   • meloxicam (MOBIC) 15 MG tablet Take 1 Tab by mouth every day. (Patient not taking: Reported on 10/30/2019) 30 Tab 0   • cyclobenzaprine (FLEXERIL) 10 MG Tab Take 1 Tab by mouth 3 times a day as needed for Muscle Spasms. (Patient not taking: Reported on 9/9/2019) 30 Tab 0     No current facility-administered medications for this visit.         Family History   Problem Relation Age of Onset   • No Known Problems Mother    • Other Father         BPH   • No Known Problems Sister    • No Known Problems Brother    • No Known Problems Brother        Social History     Socioeconomic History   • Marital status:      Spouse name: Not on file   • Number  "of children: 2   • Years of education: Not on file   • Highest education level: Not on file   Occupational History   • Occupation: [employer] - [employer]     Employer: unknown   Social Needs   • Financial resource strain: Not on file   • Food insecurity:     Worry: Not on file     Inability: Not on file   • Transportation needs:     Medical: Not on file     Non-medical: Not on file   Tobacco Use   • Smoking status: Former Smoker     Packs/day: 0.10     Years: 4.00     Pack years: 0.40     Types: Cigarettes   • Smokeless tobacco: Never Used   • Tobacco comment: quit cigarettes at age 22   Substance and Sexual Activity   • Alcohol use: Yes     Alcohol/week: 0.0 oz     Frequency: Monthly or less     Drinks per session: 3 or 4     Binge frequency: Never     Comment: occasional   • Drug use: No   • Sexual activity: Yes     Partners: Female   Lifestyle   • Physical activity:     Days per week: Not on file     Minutes per session: Not on file   • Stress: Not on file   Relationships   • Social connections:     Talks on phone: Not on file     Gets together: Not on file     Attends Judaism service: Not on file     Active member of club or organization: Not on file     Attends meetings of clubs or organizations: Not on file     Relationship status: Not on file   • Intimate partner violence:     Fear of current or ex partner: Not on file     Emotionally abused: Not on file     Physically abused: Not on file     Forced sexual activity: Not on file   Other Topics Concern   • Not on file   Social History Narrative   • Not on file       ROS:  As per the HPI as shown above, the rest are negative.       Objective:     /60 (BP Location: Left arm, Patient Position: Sitting, BP Cuff Size: Adult)   Pulse (!) 56   Temp 36.1 °C (97 °F) (Temporal)   Ht 1.778 m (5' 10\")   Wt 105.4 kg (232 lb 5.8 oz)   SpO2 98%   BMI 33.34 kg/m²     Physical Exam   Constitutional: He is oriented to person, place, and time. " He appears well-developed and well-nourished. No distress.   HENT:   Head: Normocephalic and atraumatic.   Right Ear: External ear normal.   Left Ear: External ear normal.   Nose: Nose normal.   Mouth/Throat: Oropharynx is clear and moist. No oropharyngeal exudate.   Eyes: Pupils are equal, round, and reactive to light. Conjunctivae and EOM are normal. Right eye exhibits no discharge. Left eye exhibits no discharge. No scleral icterus.   Neck: Normal range of motion. Neck supple. No JVD present. No tracheal deviation present. No thyromegaly present.   Cardiovascular: Normal rate, regular rhythm, normal heart sounds and intact distal pulses. Exam reveals no gallop and no friction rub.   No murmur heard.  Pulmonary/Chest: Effort normal and breath sounds normal. No stridor. No respiratory distress. He has no wheezes. He has no rales. He exhibits no tenderness.   Abdominal: Soft. Bowel sounds are normal. He exhibits no distension and no mass. There is no tenderness. There is no rebound and no guarding. No hernia. Hernia confirmed negative in the right inguinal area and confirmed negative in the left inguinal area.   Genitourinary: Testes normal and penis normal. Right testis shows no mass, no swelling and no tenderness. Right testis is descended. Left testis shows no mass, no swelling and no tenderness. Left testis is descended. Uncircumcised. No phimosis, paraphimosis, hypospadias, penile erythema or penile tenderness. No discharge found.   Musculoskeletal: Normal range of motion. He exhibits no edema, tenderness or deformity.   Lymphadenopathy:     He has no cervical adenopathy. No inguinal adenopathy noted on the right or left side.        Right: No inguinal adenopathy present.        Left: No inguinal adenopathy present.   Neurological: He is alert and oriented to person, place, and time. He has normal reflexes. He displays normal reflexes. No cranial nerve deficit. He exhibits normal muscle tone. Coordination  normal.   Skin: Skin is warm and dry. No rash noted. He is not diaphoretic. No erythema. No pallor.   7 mm nodule midline of upper thoacic area consitent with black head   Psychiatric: He has a normal mood and affect. His behavior is normal. Judgment and thought content normal.   Nursing note and vitals reviewed.       Assessment/Plan:     1. Routine physical examination  Complete exam was done. Patient will get a flu shot today. He is up to date with Tdap.  - Lipid Profile; Future  - Comp Metabolic Panel; Future  - HEMOGLOBIN A1C; Future  - CBC WITH DIFFERENTIAL; Future    2. Impaired fasting blood sugar  Borderline elevation of blood sugar but nondiabetic level.  He will work hard on avoidance of sweets, decreasing the carbs, regular exercise and weight loss. He will complete blood work following his visit and I will communicate the results to him through [facility].  - Lipid Profile; Future  - Comp Metabolic Panel; Future  - HEMOGLOBIN A1C; Future  - CBC WITH DIFFERENTIAL; Future    3. Dyslipidemia  10-year ASCVD risk is 2.3%. Last year his triglycerides were slightly elevated and HDL was low below 40 but LDL was at goal. Advised to avoid sweets, decrease the carbs, exercise regularly. He will complete blood work following his visit and I will communicate the results to him through [facility].  - Lipid Profile; Future  - Comp Metabolic Panel; Future  - HEMOGLOBIN A1C; Future  - CBC WITH DIFFERENTIAL; Future    4. Need for immunization against influenza  Influenza vaccine given today without adverse effects.   - Influenza Vaccine Quad Injection (PF)        Shona MULLINS (Scribe), am scribing for, and in the presence of, Nova Gifford MD     Electronically signed by: Shona Howell ([place]), 10/30/2019    Nova MULLINS MD personally performed the services described in this documentation, as scribed by Shona Howell in my presence, and it is both accurate and complete.

## 2020-05-16 ENCOUNTER — OFFICE VISIT (OUTPATIENT)
Dept: URGENT CARE | Facility: PHYSICIAN GROUP | Age: 50
End: 2020-05-16
Payer: COMMERCIAL

## 2020-05-16 VITALS
DIASTOLIC BLOOD PRESSURE: 78 MMHG | RESPIRATION RATE: 18 BRPM | WEIGHT: 227 LBS | BODY MASS INDEX: 31.78 KG/M2 | TEMPERATURE: 97.9 F | SYSTOLIC BLOOD PRESSURE: 110 MMHG | HEART RATE: 79 BPM | OXYGEN SATURATION: 100 % | HEIGHT: 71 IN

## 2020-05-16 DIAGNOSIS — S01.01XA LACERATION OF SCALP, INITIAL ENCOUNTER: ICD-10-CM

## 2020-05-16 PROCEDURE — 12002 RPR S/N/AX/GEN/TRNK2.6-7.5CM: CPT | Performed by: FAMILY MEDICINE

## 2020-05-16 ASSESSMENT — FIBROSIS 4 INDEX: FIB4 SCORE: 0.66

## 2020-05-16 NOTE — PROGRESS NOTES
"Subjective:      Elder Starks is a 49 y.o. male who presents with Laceration (pt states he had abranch fall on his head today )            This is a new problem.  41-year-old patient otherwise healthy presenting for evaluation of scalp laceration that happened today.  He was cutting a branch at his home in the branch bent and hit his top of the head.  He denies any headache or loss of consciousness.  He denies any numbness or tingling in the upper or lower extremity muscle weakness or change in vision.  He is not on any medication.  He has not taken any over-the-counter medication.  No other injuries.  Immunizations are up-to-date.  Patient reports that his wife cleaned the wound after it happened with water and hydrogen peroxide.      ROS       Objective:     /78 (BP Location: Left arm, Patient Position: Sitting, BP Cuff Size: Adult)   Pulse 79   Temp 36.6 °C (97.9 °F) (Temporal)   Resp 18   Ht 1.803 m (5' 11\")   Wt 103 kg (227 lb)   SpO2 100%   BMI 31.66 kg/m²      Physical Exam  Constitutional:       General: He is not in acute distress.     Appearance: He is not ill-appearing, toxic-appearing or diaphoretic.   HENT:      Right Ear: External ear normal.      Left Ear: External ear normal.      Nose: Nose normal.      Mouth/Throat:      Mouth: Mucous membranes are moist.      Pharynx: Oropharynx is clear. No oropharyngeal exudate or posterior oropharyngeal erythema.   Eyes:      Extraocular Movements: Extraocular movements intact.      Conjunctiva/sclera: Conjunctivae normal.      Pupils: Pupils are equal, round, and reactive to light.   Cardiovascular:      Rate and Rhythm: Normal rate.   Pulmonary:      Effort: Pulmonary effort is normal. No respiratory distress.      Breath sounds: No stridor.   Skin:     Coloration: Skin is not jaundiced or pale.             Comments: About 2.5 cm scalp laceration on the outlined area.  No active bleeding.   Neurological:      Mental Status: He is alert and oriented " to person, place, and time.      Cranial Nerves: Cranial nerves are intact.      Sensory: Sensation is intact.      Motor: Motor function is intact.   Psychiatric:         Mood and Affect: Mood normal.                 Assessment/Plan:   ASSESSMENT:PLAN:  1. Laceration of scalp, initial encounter      Wound management options were discussed with the patient and/or parent and they agree to proceed.     Anesthesia: none  Irrigation/Prep: Wound was cleansed with normal saline excluded/removed.  Closure: 4 staples placed and patient tolerated procedure well.  Topical antibiotic applied  Home care, signs/symptoms of infection and suture removal guidelines were discussed with patient/parent.   staple removal in 7 days  Wound recheck in couple of days.  Plan as outlined above and per patient instruction  Warning signs reviewed.

## 2020-05-16 NOTE — PATIENT INSTRUCTIONS
Laceration Care, Adult  A laceration is a cut that goes through all layers of the skin. The cut also goes into the tissue that is right under the skin. Some cuts heal on their own. Others need to be closed with stitches (sutures), staples, skin adhesive strips, or wound glue. Taking care of your cut lowers your risk of infection and helps your cut to heal better.  HOW TO TAKE CARE OF YOUR CUT  For stitches or staples:  · Keep the wound clean and dry.  · If you were given a bandage (dressing), you should change it at least one time per day or as told by your doctor. You should also change it if it gets wet or dirty.  · Keep the wound completely dry for the first 24 hours or as told by your doctor. After that time, you may take a shower or a bath. However, make sure that the wound is not soaked in water until after the stitches or staples have been removed.  · Clean the wound one time each day or as told by your doctor:  ¨ Wash the wound with soap and water.  ¨ Rinse the wound with water until all of the soap comes off.  ¨ Pat the wound dry with a clean towel. Do not rub the wound.  · After you clean the wound, put a thin layer of antibiotic ointment on it as told by your doctor. This ointment:  ¨ Helps to prevent infection.  ¨ Keeps the bandage from sticking to the wound.  · Have your stitches or staples removed as told by your doctor.  If your doctor used skin adhesive strips:   · Keep the wound clean and dry.  · If you were given a bandage, you should change it at least one time per day or as told by your doctor. You should also change it if it gets dirty or wet.  · Do not get the skin adhesive strips wet. You can take a shower or a bath, but be careful to keep the wound dry.  · If the wound gets wet, pat it dry with a clean towel. Do not rub the wound.  · Skin adhesive strips fall off on their own. You can trim the strips as the wound heals. Do not remove any strips that are still stuck to the wound. They will  fall off after a while.  If your doctor used wound glue:  · Try to keep your wound dry, but you may briefly wet it in the shower or bath. Do not soak the wound in water, such as by swimming.  · After you take a shower or a bath, gently pat the wound dry with a clean towel. Do not rub the wound.  · Do not do any activities that will make you really sweaty until the skin glue has fallen off on its own.  · Do not apply liquid, cream, or ointment medicine to your wound while the skin glue is still on.  · If you were given a bandage, you should change it at least one time per day or as told by your doctor. You should also change it if it gets dirty or wet.  · If a bandage is placed over the wound, do not let the tape for the bandage touch the skin glue.  · Do not pick at the glue. The skin glue usually stays on for 5-10 days. Then, it falls off of the skin.  General Instructions   · To help prevent scarring, make sure to cover your wound with sunscreen whenever you are outside after stitches are removed, after adhesive strips are removed, or when wound glue stays in place and the wound is healed. Make sure to wear a sunscreen of at least 30 SPF.  · Take over-the-counter and prescription medicines only as told by your doctor.  · If you were given antibiotic medicine or ointment, take or apply it as told by your doctor. Do not stop using the antibiotic even if your wound is getting better.  · Do not scratch or pick at the wound.  · Keep all follow-up visits as told by your doctor. This is important.  · Check your wound every day for signs of infection. Watch for:  ¨ Redness, swelling, or pain.  ¨ Fluid, blood, or pus.  · Raise (elevate) the injured area above the level of your heart while you are sitting or lying down, if possible.  GET HELP IF:  · You got a tetanus shot and you have any of these problems at the injection site:  ¨ Swelling.  ¨ Very bad pain.  ¨ Redness.  ¨ Bleeding.  · You have a fever.  · A wound that was  closed breaks open.  · You notice a bad smell coming from your wound or your bandage.  · You notice something coming out of the wound, such as wood or glass.  · Medicine does not help your pain.  · You have more redness, swelling, or pain at the site of your wound.  · You have fluid, blood, or pus coming from your wound.  · You notice a change in the color of your skin near your wound.  · You need to change the bandage often because fluid, blood, or pus is coming from the wound.  · You start to have a new rash.  · You start to have numbness around the wound.  GET HELP RIGHT AWAY IF:  · You have very bad swelling around the wound.  · Your pain suddenly gets worse and is very bad.  · You notice painful lumps near the wound or on skin that is anywhere on your body.  · You have a red streak going away from your wound.  · The wound is on your hand or foot and you cannot move a finger or toe like you usually can.  · The wound is on your hand or foot and you notice that your fingers or toes look pale or bluish.     This information is not intended to replace advice given to you by your health care provider. Make sure you discuss any questions you have with your health care provider.     Document Released: 06/05/2009 Document Revised: 05/03/2016 Document Reviewed: 12/14/2015  [employer] Interactive Patient Education ©2016 [employer]

## 2020-05-18 ENCOUNTER — OFFICE VISIT (OUTPATIENT)
Dept: URGENT CARE | Facility: PHYSICIAN GROUP | Age: 50
End: 2020-05-18
Payer: COMMERCIAL

## 2020-05-18 VITALS
HEART RATE: 73 BPM | TEMPERATURE: 98 F | DIASTOLIC BLOOD PRESSURE: 78 MMHG | RESPIRATION RATE: 12 BRPM | SYSTOLIC BLOOD PRESSURE: 110 MMHG | WEIGHT: 227 LBS | HEIGHT: 71 IN | OXYGEN SATURATION: 98 % | BODY MASS INDEX: 31.78 KG/M2

## 2020-05-18 DIAGNOSIS — Z51.89 VISIT FOR WOUND CHECK: ICD-10-CM

## 2020-05-18 PROCEDURE — 99024 POSTOP FOLLOW-UP VISIT: CPT | Performed by: PHYSICIAN ASSISTANT

## 2020-05-18 ASSESSMENT — FIBROSIS 4 INDEX: FIB4 SCORE: 0.66

## 2020-05-18 ASSESSMENT — ENCOUNTER SYMPTOMS
FEVER: 0
HEADACHES: 0
VOMITING: 0
NAUSEA: 0
CHILLS: 0

## 2020-05-19 NOTE — PROGRESS NOTES
"Subjective:      Elder Starks is a 49 y.o. male who presents with Wound Check (Scalp Lac, no issues  x3days )            Wound Check   He was originally treated 2 to 3 days ago (2 days ago). Previous treatment included laceration repair. Maximum temperature: no fever. There has been no drainage from the wound. The redness has improved. The swelling has improved. The pain has improved.     4 staples placed in scalp.     Past Medical History:   Diagnosis Date   • No significant past medical history        No past surgical history on file.    Family History   Problem Relation Age of Onset   • No Known Problems Mother    • Other Father         BPH   • No Known Problems Sister    • No Known Problems Brother    • No Known Problems Brother        No Known Allergies    Medications, Allergies, and current problem list reviewed today in Epic    Review of Systems   Constitutional: Negative for chills, fever and malaise/fatigue.   Gastrointestinal: Negative for nausea and vomiting.   Skin:        Laceration scalp- 4 staples placed   Neurological: Negative for headaches.     All other systems reviewed and are negative.        Objective:     /78   Pulse 73   Temp 36.7 °C (98 °F) (Temporal)   Resp 12   Ht 1.803 m (5' 11\")   Wt 103 kg (227 lb)   SpO2 98%   BMI 31.66 kg/m²      Physical Exam  Constitutional:       General: He is not in acute distress.  Cardiovascular:      Rate and Rhythm: Normal rate.   Pulmonary:      Effort: Pulmonary effort is normal.   Skin:     General: Skin is warm and dry.      Comments: Scalp with curved laceration. 4 staples in place without wound dehiscence. No surrounding erythema or edema. NTTP. No purulent drainage.   Neurological:      General: No focal deficit present.      Mental Status: He is alert and oriented to person, place, and time.   Psychiatric:         Mood and Affect: Mood normal.         Behavior: Behavior normal.         Thought Content: Thought content normal.         " Judgment: Judgment normal.                 Assessment/Plan:       1. Visit for wound check  Healing well  Continue wound care  RTC in 5 days for staple removal.      Differential diagnoses, Supportive care, and indications for immediate follow-up discussed with patient.   Pathogenesis of diagnosis discussed including typical length and natural progression.   Instructed to return to clinic or nearest emergency department for any change in condition, further concerns, or worsening of symptoms.    The patient demonstrated a good understanding and agreed with the treatment plan.    Mary Beth Valenzuela P.A.-C.

## 2020-05-23 ENCOUNTER — OFFICE VISIT (OUTPATIENT)
Dept: URGENT CARE | Facility: PHYSICIAN GROUP | Age: 50
End: 2020-05-23
Payer: COMMERCIAL

## 2020-05-23 VITALS
SYSTOLIC BLOOD PRESSURE: 114 MMHG | BODY MASS INDEX: 31.78 KG/M2 | WEIGHT: 227 LBS | TEMPERATURE: 97 F | HEART RATE: 74 BPM | OXYGEN SATURATION: 96 % | RESPIRATION RATE: 16 BRPM | DIASTOLIC BLOOD PRESSURE: 86 MMHG | HEIGHT: 71 IN

## 2020-05-23 DIAGNOSIS — Z48.02 ENCOUNTER FOR STAPLE REMOVAL: ICD-10-CM

## 2020-05-23 ASSESSMENT — FIBROSIS 4 INDEX: FIB4 SCORE: 0.66

## 2020-05-23 NOTE — PROGRESS NOTES
Subjective:      Elder Starks is a 49 y.o. male who presents with Suture / Staple Removal (remove staples from head)    Vitals:    05/23/20 1513   BP: 114/86   Pulse: 74   Resp: 16   Temp: 36.1 °C (97 °F)   SpO2: 96%         HPI:   Presents for staple removal 7 days post procedure. Laceration without pain, discharge or redness.     ROS:   no fever, no sensory loss, no weakness    Exam:   Gen: WDWN in no distress  Wound: well healing laceration. 4 staples removed without difficulty. No evidence of dehiscence or infection.  Neuro: sensory/motor intact     A/P   Laceration  Staple Removal  F/U prn

## 2020-10-22 ENCOUNTER — OFFICE VISIT (OUTPATIENT)
Dept: MEDICAL GROUP | Facility: PHYSICIAN GROUP | Age: 50
End: 2020-10-22
Payer: COMMERCIAL

## 2020-10-22 ENCOUNTER — HOSPITAL ENCOUNTER (OUTPATIENT)
Dept: LAB | Facility: MEDICAL CENTER | Age: 50
End: 2020-10-22
Attending: FAMILY MEDICINE
Payer: COMMERCIAL

## 2020-10-22 VITALS
OXYGEN SATURATION: 99 % | DIASTOLIC BLOOD PRESSURE: 70 MMHG | SYSTOLIC BLOOD PRESSURE: 120 MMHG | BODY MASS INDEX: 32.53 KG/M2 | HEIGHT: 71 IN | WEIGHT: 232.37 LBS | HEART RATE: 63 BPM | TEMPERATURE: 97.2 F

## 2020-10-22 DIAGNOSIS — E78.5 DYSLIPIDEMIA: ICD-10-CM

## 2020-10-22 DIAGNOSIS — Z00.00 ROUTINE PHYSICAL EXAMINATION: ICD-10-CM

## 2020-10-22 DIAGNOSIS — Z23 NEED FOR IMMUNIZATION AGAINST INFLUENZA: ICD-10-CM

## 2020-10-22 DIAGNOSIS — R73.01 IMPAIRED FASTING BLOOD SUGAR: ICD-10-CM

## 2020-10-22 LAB
ALBUMIN SERPL BCP-MCNC: 4.2 G/DL (ref 3.2–4.9)
ALBUMIN/GLOB SERPL: 1.7 G/DL
ALP SERPL-CCNC: 64 U/L (ref 30–99)
ALT SERPL-CCNC: 17 U/L (ref 2–50)
ANION GAP SERPL CALC-SCNC: 7 MMOL/L (ref 7–16)
AST SERPL-CCNC: 15 U/L (ref 12–45)
BASOPHILS # BLD AUTO: 0.8 % (ref 0–1.8)
BASOPHILS # BLD: 0.05 K/UL (ref 0–0.12)
BILIRUB SERPL-MCNC: 0.4 MG/DL (ref 0.1–1.5)
BUN SERPL-MCNC: 16 MG/DL (ref 8–22)
CALCIUM SERPL-MCNC: 8.8 MG/DL (ref 8.5–10.5)
CHLORIDE SERPL-SCNC: 105 MMOL/L (ref 96–112)
CHOLEST SERPL-MCNC: 151 MG/DL (ref 100–199)
CO2 SERPL-SCNC: 27 MMOL/L (ref 20–33)
CREAT SERPL-MCNC: 0.94 MG/DL (ref 0.5–1.4)
EOSINOPHIL # BLD AUTO: 0.15 K/UL (ref 0–0.51)
EOSINOPHIL NFR BLD: 2.4 % (ref 0–6.9)
ERYTHROCYTE [DISTWIDTH] IN BLOOD BY AUTOMATED COUNT: 45.6 FL (ref 35.9–50)
EST. AVERAGE GLUCOSE BLD GHB EST-MCNC: 143 MG/DL
FASTING STATUS PATIENT QL REPORTED: NORMAL
GLOBULIN SER CALC-MCNC: 2.5 G/DL (ref 1.9–3.5)
GLUCOSE SERPL-MCNC: 110 MG/DL (ref 65–99)
HBA1C MFR BLD: 6.6 % (ref 0–5.6)
HCT VFR BLD AUTO: 45.6 % (ref 42–52)
HDLC SERPL-MCNC: 38 MG/DL
HGB BLD-MCNC: 14.6 G/DL (ref 14–18)
IMM GRANULOCYTES # BLD AUTO: 0.04 K/UL (ref 0–0.11)
IMM GRANULOCYTES NFR BLD AUTO: 0.6 % (ref 0–0.9)
LDLC SERPL CALC-MCNC: 96 MG/DL
LYMPHOCYTES # BLD AUTO: 1.68 K/UL (ref 1–4.8)
LYMPHOCYTES NFR BLD: 26.7 % (ref 22–41)
MCH RBC QN AUTO: 28.3 PG (ref 27–33)
MCHC RBC AUTO-ENTMCNC: 32 G/DL (ref 33.7–35.3)
MCV RBC AUTO: 88.4 FL (ref 81.4–97.8)
MONOCYTES # BLD AUTO: 0.49 K/UL (ref 0–0.85)
MONOCYTES NFR BLD AUTO: 7.8 % (ref 0–13.4)
NEUTROPHILS # BLD AUTO: 3.89 K/UL (ref 1.82–7.42)
NEUTROPHILS NFR BLD: 61.7 % (ref 44–72)
NRBC # BLD AUTO: 0 K/UL
NRBC BLD-RTO: 0 /100 WBC
PLATELET # BLD AUTO: 308 K/UL (ref 164–446)
PMV BLD AUTO: 10.8 FL (ref 9–12.9)
POTASSIUM SERPL-SCNC: 4.5 MMOL/L (ref 3.6–5.5)
PROT SERPL-MCNC: 6.7 G/DL (ref 6–8.2)
RBC # BLD AUTO: 5.16 M/UL (ref 4.7–6.1)
SODIUM SERPL-SCNC: 139 MMOL/L (ref 135–145)
TRIGL SERPL-MCNC: 84 MG/DL (ref 0–149)
WBC # BLD AUTO: 6.3 K/UL (ref 4.8–10.8)

## 2020-10-22 PROCEDURE — 90471 IMMUNIZATION ADMIN: CPT | Performed by: FAMILY MEDICINE

## 2020-10-22 PROCEDURE — 80061 LIPID PANEL: CPT

## 2020-10-22 PROCEDURE — 85025 COMPLETE CBC W/AUTO DIFF WBC: CPT

## 2020-10-22 PROCEDURE — 90686 IIV4 VACC NO PRSV 0.5 ML IM: CPT | Performed by: FAMILY MEDICINE

## 2020-10-22 PROCEDURE — 99396 PREV VISIT EST AGE 40-64: CPT | Mod: 25 | Performed by: FAMILY MEDICINE

## 2020-10-22 PROCEDURE — 83036 HEMOGLOBIN GLYCOSYLATED A1C: CPT

## 2020-10-22 PROCEDURE — 80053 COMPREHEN METABOLIC PANEL: CPT

## 2020-10-22 PROCEDURE — 36415 COLL VENOUS BLD VENIPUNCTURE: CPT

## 2020-10-22 ASSESSMENT — FIBROSIS 4 INDEX: FIB4 SCORE: 0.68

## 2020-10-22 ASSESSMENT — PATIENT HEALTH QUESTIONNAIRE - PHQ9: CLINICAL INTERPRETATION OF PHQ2 SCORE: 0

## 2020-10-22 NOTE — PROGRESS NOTES
Subjective:      Elder Starks is a 50 y.o. male who presents with Annual Exam            HPI     Patient is here for annual physical exam.  Is up-to-date with Tdap with the last one in 2016.  Needs flu shot today.    He said he has been having on and off knee pain when he has been on his knees squatting or kneeling at work or when he stands up after sitting for a long time.  Denies any redness or swelling of the knees.  Denies any trauma.    We follow him for impaired fasting blood sugar and he manages this by watching his diet.  He said he has avoided sugary drinks and desserts but he has not been good in watching the carbs for the last hemoglobin A1c was 5.9 with fasting blood glucose of 113 in October 2019.    We follow him for elevated triglycerides.  As mentioned above he has been watching his diet better than before.  He however gained 5 pounds since last visit.    I reviewed the following    Past medical history  Impaired fasting blood sugar  Dyslipidemia    History reviewed. No pertinent surgical history.    No Known Allergies    No current outpatient medications on file.     No current facility-administered medications for this visit.         Family History   Problem Relation Age of Onset   • No Known Problems Mother    • Other Father         BPH   • No Known Problems Sister    • No Known Problems Brother    • No Known Problems Brother        Social History     Socioeconomic History   • Marital status:      Spouse name: Not on file   • Number of children: 2   • Years of education: Not on file   • Highest education level: Not on file   Occupational History   • Occupation: [employer] - [employer]     Employer: unknown   Social Needs   • Financial resource strain: Not on file   • Food insecurity     Worry: Not on file     Inability: Not on file   • Transportation needs     Medical: Not on file     Non-medical: Not on file   Tobacco Use   • Smoking status: Former Smoker     Packs/day:  "0.10     Years: 4.00     Pack years: 0.40     Types: Cigarettes   • Smokeless tobacco: Never Used   • Tobacco comment: quit cigarettes at age 22   Substance and Sexual Activity   • Alcohol use: Yes     Alcohol/week: 0.0 oz     Frequency: Monthly or less     Drinks per session: 3 or 4     Binge frequency: Never     Comment: occasional   • Drug use: No   • Sexual activity: Yes     Partners: Female   Lifestyle   • Physical activity     Days per week: Not on file     Minutes per session: Not on file   • Stress: Not on file   Relationships   • Social connections     Talks on phone: Not on file     Gets together: Not on file     Attends Rastafari service: Not on file     Active member of club or organization: Not on file     Attends meetings of clubs or organizations: Not on file     Relationship status: Not on file   • Intimate partner violence     Fear of current or ex partner: Not on file     Emotionally abused: Not on file     Physically abused: Not on file     Forced sexual activity: Not on file   Other Topics Concern   • Not on file   Social History Narrative   • Not on file        ROS   As per HPI, the rest are negative.       Objective:     /70 (BP Location: Left arm, Patient Position: Sitting, BP Cuff Size: Adult)   Pulse 63   Temp 36.2 °C (97.2 °F) (Temporal)   Ht 1.803 m (5' 11\")   Wt 105.4 kg (232 lb 5.8 oz)   SpO2 99%   BMI 32.41 kg/m²      Physical Exam  Vitals signs and nursing note reviewed. Exam conducted with a chaperone present.   Constitutional:       General: He is not in acute distress.     Appearance: He is well-developed. He is not diaphoretic.   HENT:      Head: Normocephalic and atraumatic.      Right Ear: External ear normal.      Left Ear: External ear normal.      Nose: Nose normal.      Mouth/Throat:      Pharynx: No oropharyngeal exudate.   Eyes:      General: No scleral icterus.        Right eye: No discharge.         Left eye: No discharge.      Conjunctiva/sclera: Conjunctivae " normal.      Pupils: Pupils are equal, round, and reactive to light.   Neck:      Musculoskeletal: Normal range of motion and neck supple.      Thyroid: No thyromegaly.      Vascular: No JVD.      Trachea: No tracheal deviation.   Cardiovascular:      Rate and Rhythm: Normal rate and regular rhythm.      Heart sounds: Normal heart sounds. No murmur. No friction rub. No gallop.    Pulmonary:      Effort: Pulmonary effort is normal. No respiratory distress.      Breath sounds: Normal breath sounds. No stridor. No wheezing or rales.   Chest:      Chest wall: No tenderness.   Abdominal:      General: Bowel sounds are normal. There is no distension.      Palpations: Abdomen is soft. There is no mass.      Tenderness: There is no abdominal tenderness. There is no guarding or rebound.      Hernia: There is no hernia in the left inguinal area or right inguinal area.   Genitourinary:     Penis: Normal and uncircumcised. No phimosis, erythema, tenderness or discharge.       Scrotum/Testes: Normal.         Right: Mass, tenderness or swelling not present. Right testis is descended.         Left: Mass, tenderness or swelling not present. Left testis is descended.      Epididymis:      Right: Normal.      Left: Normal.   Musculoskeletal: Normal range of motion.         General: No tenderness.   Lymphadenopathy:      Cervical: No cervical adenopathy.      Lower Body: No right inguinal adenopathy. No left inguinal adenopathy.   Skin:     General: Skin is warm and dry.      Coloration: Skin is not pale.      Findings: No erythema or rash.   Neurological:      Mental Status: He is alert and oriented to person, place, and time.      Cranial Nerves: No cranial nerve deficit.      Motor: No abnormal muscle tone.      Coordination: Coordination normal.      Deep Tendon Reflexes: Reflexes normal.   Psychiatric:         Behavior: Behavior normal.         Thought Content: Thought content normal.         Judgment: Judgment normal.                       Assessment/Plan:        1. Routine physical examination  Complete exam done.  Up-to-date with Tdap.  Flu shot was given today.    2. Impaired fasting blood sugar  We will do updated blood work.  Continue to avoid sweets and advised to decrease the carbs.  Advised regular exercises and weight loss.  - Lipid Profile; Future  - Comp Metabolic Panel; Future  - HEMOGLOBIN A1C; Future  - CBC WITH DIFFERENTIAL; Future    3. Dyslipidemia  We will do updated lipid panel.  - Lipid Profile; Future  - Comp Metabolic Panel; Future  - HEMOGLOBIN A1C; Future  - CBC WITH DIFFERENTIAL; Future    4. Need for immunization against influenza  Flu shot was given.  - Influenza Vaccine Quad Injection (PF)    5.  Bilateral knee pain  No abnormal findings on exam.  Most likely problem with the tracking of the patella.  Advised to stand up and stretch every hour if he has been squatting or kneeling or sitting for long time.      Please note that this dictation was created using voice recognition software. I have worked with consultants from the vendor as well as technical experts from [facility]  [employer] to optimize the interface. I have made every reasonable attempt to correct obvious errors, but I expect that there are errors of grammar and possibly content I did not discover before finalizing the note.

## 2020-10-23 DIAGNOSIS — R73.01 IMPAIRED FASTING BLOOD SUGAR: ICD-10-CM

## 2020-10-23 DIAGNOSIS — E78.5 DYSLIPIDEMIA: ICD-10-CM

## 2021-01-28 ENCOUNTER — HOSPITAL ENCOUNTER (OUTPATIENT)
Dept: RADIOLOGY | Facility: MEDICAL CENTER | Age: 51
End: 2021-01-28
Attending: FAMILY MEDICINE
Payer: COMMERCIAL

## 2021-01-28 ENCOUNTER — OFFICE VISIT (OUTPATIENT)
Dept: MEDICAL GROUP | Facility: PHYSICIAN GROUP | Age: 51
End: 2021-01-28
Payer: COMMERCIAL

## 2021-01-28 VITALS
WEIGHT: 234.13 LBS | SYSTOLIC BLOOD PRESSURE: 100 MMHG | HEART RATE: 64 BPM | TEMPERATURE: 97.7 F | BODY MASS INDEX: 32.78 KG/M2 | OXYGEN SATURATION: 97 % | HEIGHT: 71 IN | DIASTOLIC BLOOD PRESSURE: 80 MMHG

## 2021-01-28 DIAGNOSIS — M79.672 LEFT FOOT PAIN: ICD-10-CM

## 2021-01-28 PROCEDURE — 73630 X-RAY EXAM OF FOOT: CPT | Mod: LT

## 2021-01-28 PROCEDURE — 99214 OFFICE O/P EST MOD 30 MIN: CPT | Performed by: FAMILY MEDICINE

## 2021-01-28 RX ORDER — MELOXICAM 15 MG/1
15 TABLET ORAL DAILY
Qty: 30 TAB | Refills: 1 | Status: SHIPPED | OUTPATIENT
Start: 2021-01-28 | End: 2023-01-25

## 2021-01-28 ASSESSMENT — FIBROSIS 4 INDEX: FIB4 SCORE: 0.58

## 2021-01-28 ASSESSMENT — PATIENT HEALTH QUESTIONNAIRE - PHQ9: CLINICAL INTERPRETATION OF PHQ2 SCORE: 0

## 2021-01-29 NOTE — PROGRESS NOTES
"Subjective:      Elder Starks is a 50 y.o. male who presents with Foot Injury (right)            HPI     Patient is here complaining of 3-week duration of left foot pain.  Denies any trauma.  Pain is worse when he first gets up to walk after sitting for a while or when he gets out of bed.  The pain gets better when he has been up and about for a while.  He has not been taking any medication for the pain.  Denies history of gout.    Past medical history, past surgical history, family history reviewed-no changes    Social history reviewed-no changes    Allergies reviewed-no changes    Medications reviewed-no changes    ROS     As per HPI, the rest are negative.       Objective:     /80 (BP Location: Left arm, Patient Position: Sitting, BP Cuff Size: Adult)   Pulse 64   Temp 36.5 °C (97.7 °F) (Temporal)   Ht 1.803 m (5' 11\")   Wt 106 kg (234 lb 2.1 oz)   SpO2 97%   BMI 32.65 kg/m²      Physical Exam     Examined alert, awake, oriented, not in distress    Left foot-strong pedal pulse, there is slight swelling in the medial aspect of the arch just below the medial malleolus with pinpoint tenderness in this area, no limitation of range of movement, no erythema or warmth or skin changes          Assessment/Plan:        1. Left foot pain  We will get x-ray of the foot.  Warm compresses 15 minutes 3 times a day.  Meloxicam 15 mg 1 tablet daily with food as needed.  I will get back with him with results.  Consider referral to podiatrist depending on results.  Advised wearing gel arch support.  - DX-FOOT-COMPLETE 3+ LEFT; Future  - meloxicam (MOBIC) 15 MG tablet; Take 1 Tab by mouth every day.  Dispense: 30 Tab; Refill: 1      Please note that this dictation was created using voice recognition software. I have worked with consultants from the vendor as well as technical experts from [employer] to optimize the interface. I have made every reasonable attempt to correct obvious errors, but I expect that there are " errors of grammar and possibly content I did not discover before finalizing the note.

## 2021-02-03 DIAGNOSIS — M79.672 LEFT FOOT PAIN: ICD-10-CM

## 2021-02-17 ENCOUNTER — HOSPITAL ENCOUNTER (OUTPATIENT)
Dept: LAB | Facility: MEDICAL CENTER | Age: 51
End: 2021-02-17
Attending: FAMILY MEDICINE
Payer: COMMERCIAL

## 2021-02-17 DIAGNOSIS — E78.5 DYSLIPIDEMIA: ICD-10-CM

## 2021-02-17 DIAGNOSIS — R73.01 IMPAIRED FASTING BLOOD SUGAR: ICD-10-CM

## 2021-02-17 LAB
ALBUMIN SERPL BCP-MCNC: 4.1 G/DL (ref 3.2–4.9)
ALBUMIN/GLOB SERPL: 1.4 G/DL
ALP SERPL-CCNC: 73 U/L (ref 30–99)
ALT SERPL-CCNC: 19 U/L (ref 2–50)
ANION GAP SERPL CALC-SCNC: 7 MMOL/L (ref 7–16)
AST SERPL-CCNC: 18 U/L (ref 12–45)
BILIRUB SERPL-MCNC: 0.3 MG/DL (ref 0.1–1.5)
BUN SERPL-MCNC: 19 MG/DL (ref 8–22)
CALCIUM SERPL-MCNC: 9.2 MG/DL (ref 8.5–10.5)
CHLORIDE SERPL-SCNC: 105 MMOL/L (ref 96–112)
CHOLEST SERPL-MCNC: 167 MG/DL (ref 100–199)
CO2 SERPL-SCNC: 25 MMOL/L (ref 20–33)
CREAT SERPL-MCNC: 1 MG/DL (ref 0.5–1.4)
CREAT UR-MCNC: 162.3 MG/DL
EST. AVERAGE GLUCOSE BLD GHB EST-MCNC: 146 MG/DL
FASTING STATUS PATIENT QL REPORTED: NORMAL
GLOBULIN SER CALC-MCNC: 3 G/DL (ref 1.9–3.5)
GLUCOSE SERPL-MCNC: 116 MG/DL (ref 65–99)
HBA1C MFR BLD: 6.7 % (ref 0–5.6)
HDLC SERPL-MCNC: 40 MG/DL
LDLC SERPL CALC-MCNC: 108 MG/DL
MICROALBUMIN UR-MCNC: <1.2 MG/DL
MICROALBUMIN/CREAT UR: NORMAL MG/G (ref 0–30)
POTASSIUM SERPL-SCNC: 4.9 MMOL/L (ref 3.6–5.5)
PROT SERPL-MCNC: 7.1 G/DL (ref 6–8.2)
SODIUM SERPL-SCNC: 137 MMOL/L (ref 135–145)
TRIGL SERPL-MCNC: 97 MG/DL (ref 0–149)
URATE SERPL-MCNC: 6.2 MG/DL (ref 2.5–8.3)

## 2021-02-17 PROCEDURE — 84550 ASSAY OF BLOOD/URIC ACID: CPT

## 2021-02-17 PROCEDURE — 80061 LIPID PANEL: CPT

## 2021-02-17 PROCEDURE — 80053 COMPREHEN METABOLIC PANEL: CPT

## 2021-02-17 PROCEDURE — 82570 ASSAY OF URINE CREATININE: CPT

## 2021-02-17 PROCEDURE — 36415 COLL VENOUS BLD VENIPUNCTURE: CPT

## 2021-02-17 PROCEDURE — 83036 HEMOGLOBIN GLYCOSYLATED A1C: CPT

## 2021-02-17 PROCEDURE — 82043 UR ALBUMIN QUANTITATIVE: CPT

## 2021-02-22 ENCOUNTER — OFFICE VISIT (OUTPATIENT)
Dept: MEDICAL GROUP | Facility: PHYSICIAN GROUP | Age: 51
End: 2021-02-22
Payer: COMMERCIAL

## 2021-02-22 VITALS
HEIGHT: 71 IN | DIASTOLIC BLOOD PRESSURE: 70 MMHG | BODY MASS INDEX: 32.25 KG/M2 | HEART RATE: 78 BPM | TEMPERATURE: 98.2 F | OXYGEN SATURATION: 95 % | WEIGHT: 230.38 LBS | SYSTOLIC BLOOD PRESSURE: 110 MMHG

## 2021-02-22 DIAGNOSIS — E78.5 DYSLIPIDEMIA: ICD-10-CM

## 2021-02-22 DIAGNOSIS — E11.9 DIABETES MELLITUS WITHOUT COMPLICATION (HCC): ICD-10-CM

## 2021-02-22 DIAGNOSIS — M79.672 LEFT FOOT PAIN: ICD-10-CM

## 2021-02-22 PROCEDURE — 99214 OFFICE O/P EST MOD 30 MIN: CPT | Performed by: FAMILY MEDICINE

## 2021-02-22 ASSESSMENT — FIBROSIS 4 INDEX: FIB4 SCORE: 0.66

## 2021-02-23 RX ORDER — INDOMETHACIN 50 MG/1
CAPSULE ORAL
COMMUNITY
Start: 2021-02-10 | End: 2023-01-25

## 2021-02-23 NOTE — PROGRESS NOTES
"Subjective:      Elder Starks is a 50 y.o. male who presents with Results (labs)            HPI     Patient is here for follow-up of his medical problems.    We have been following him for impaired fasting blood sugar and he has been managing this with his own efforts only.  His hemoglobin A1c has increased from 5.9-6.6 in October 2020 which was already in the diabetic level.  I did not diagnose him yet with diabetes as this was the first time that the hemoglobin A1c increased to diabetic level.  He said he has avoided tortillas and has switched to brown rice.  Blood work was done before this visit.    In terms of dyslipidemia, patient continues to manage this with his own efforts only.    I saw him for left foot pain about 3 weeks ago.  His pain was at the proximal arch just below the medial malleolus.  There was no trauma.  I sent him for x-ray which did not show any acute findings and showed mild degenerative changes in the talonavicular joint and the first MTP joint.  His pain worsened with conservative measures with difficulty bearing weight and so I sent him to the [facility] and he was seen and evaluated by Dr. Angulo.  He was sent for uric acid level which came back at 6.2.  Initially I put him on meloxicam which was changed by Dr. Angulo to a different anti-inflammatory which he said he takes 3 times a day and has been helping.  He was also referred to physical therapy which he has not started yet.    Past medical history, past surgical history, family history reviewed-no changes    Social history reviewed-no changes    Allergies reviewed-no changes    Medications reviewed-no changes        ROS     As per HPI, the rest are negative.       Objective:     /70 (BP Location: Left arm, Patient Position: Sitting, BP Cuff Size: Adult)   Pulse 78   Temp 36.8 °C (98.2 °F) (Temporal)   Ht 1.803 m (5' 11\")   Wt 104 kg (230 lb 6.1 oz)   SpO2 95%   BMI 32.13 kg/m²      Physical Exam "     Examined alert, awake, oriented, not in distress    Neck-supple, no lymphadenopathy, no thyromegaly  Lungs-clear to auscultation, no rales, no wheezes  Heart-regular rate and rhythm, no murmur  Extremities-no edema, clubbing, cyanosis     Hospital Outpatient Visit on 02/17/2021   Component Date Value Ref Range Status   • Creatinine, Urine 02/17/2021 162.30  mg/dL Final   • Microalbumin, Urine Random 02/17/2021 <1.2  mg/dL Final   • Micro Alb Creat Ratio 02/17/2021 see below  0 - 30 mg/g Final    Comment: Unable to calculate the microalbumin/creatinine ratio due to  the microalbumin result or the urine creatinine result being  outside the measurement range of the analyzer.     • Glycohemoglobin 02/17/2021 6.7* 0.0 - 5.6 % Final    Comment: Increased risk for diabetes:  5.7 -6.4%  Diabetes:  >6.4%  Glycemic control for adults with diabetes:  <7.0%  The above interpretations are per ADA guidelines.  Diagnosis  of diabetes mellitus on the basis of elevated Hemoglobin A1c  should be confirmed by repeating the Hb A1c test.     • Est Avg Glucose 02/17/2021 146  mg/dL Final    Comment: The eAG calculation is based on the A1c-Derived Daily Glucose  (ADAG) study.  See the ADA's website for additional information.     • Sodium 02/17/2021 137  135 - 145 mmol/L Final   • Potassium 02/17/2021 4.9  3.6 - 5.5 mmol/L Final   • Chloride 02/17/2021 105  96 - 112 mmol/L Final   • Co2 02/17/2021 25  20 - 33 mmol/L Final   • Anion Gap 02/17/2021 7.0  7.0 - 16.0 Final   • Glucose 02/17/2021 116* 65 - 99 mg/dL Final   • Bun 02/17/2021 19  8 - 22 mg/dL Final   • Creatinine 02/17/2021 1.00  0.50 - 1.40 mg/dL Final   • Calcium 02/17/2021 9.2  8.5 - 10.5 mg/dL Final   • AST(SGOT) 02/17/2021 18  12 - 45 U/L Final   • ALT(SGPT) 02/17/2021 19  2 - 50 U/L Final   • Alkaline Phosphatase 02/17/2021 73  30 - 99 U/L Final   • Total Bilirubin 02/17/2021 0.3  0.1 - 1.5 mg/dL Final   • Albumin 02/17/2021 4.1  3.2 - 4.9 g/dL Final   • Total Protein  02/17/2021 7.1  6.0 - 8.2 g/dL Final   • Globulin 02/17/2021 3.0  1.9 - 3.5 g/dL Final   • A-G Ratio 02/17/2021 1.4  g/dL Final   • Cholesterol,Tot 02/17/2021 167  100 - 199 mg/dL Final   • Triglycerides 02/17/2021 97  0 - 149 mg/dL Final   • HDL 02/17/2021 40  >=40 mg/dL Final   • LDL 02/17/2021 108* <100 mg/dL Final   • Fasting Status 02/17/2021 Fasting   Final   • GFR If  02/17/2021 >60  >60 mL/min/1.73 m 2 Final   • GFR If Non  02/17/2021 >60  >60 mL/min/1.73 m 2 Final          Assessment/Plan:        1. Diabetes mellitus without complication (HCC)  Hemoglobin A1c is higher than before at 6.7 consistent with diabetes.  To hemoglobin A1c results are already diabetic level.  This is however not out of control so we do not need to treat with medication.  He will work hard on avoidance of sweets, decreasing carbs, regular exercise and weight loss.  I told him if the hemoglobin A1c goes up to 7.0 or higher then we will start medication.  I will reevaluate in 3 months.  - Lipid Profile; Future  - Comp Metabolic Panel; Future  - HEMOGLOBIN A1C; Future    2. Dyslipidemia  The LDL cholesterol has increased from 96-1 08.  HDL improved from 38-40.  The goal for the LDL cholesterol should be below 100 because of the diabetes.  I recommended starting statin but he wants to wait until next visit.  He will work hard on avoidance of fatty foods, eating more vegetables, eating fish 3 times a week, regular exercise and weight loss and recheck blood work next visit.  We will start statin if there is no improvement.  - Lipid Profile; Future  - Comp Metabolic Panel; Future  - HEMOGLOBIN A1C; Future    3. Left foot pain  He has been seen and evaluated by the orthopedist.  The uric acid level was not consistent with gout.  He was started on a different anti-inflammatory which he could not specify which is helping better than the meloxicam.  Advised to continue taking the anti-inflammatory and he will  soon start physical therapy as recommended by the orthopedist.  We will follow along.    Follow-up in 3 months.      Please note that this dictation was created using voice recognition software. I have worked with consultants from the vendor as well as technical experts from [facility]  [employer] to optimize the interface. I have made every reasonable attempt to correct obvious errors, but I expect that there are errors of grammar and possibly content I did not discover before finalizing the note.

## 2021-05-24 DIAGNOSIS — M79.672 LEFT FOOT PAIN: ICD-10-CM

## 2021-06-03 ENCOUNTER — TELEPHONE (OUTPATIENT)
Dept: MEDICAL GROUP | Facility: PHYSICIAN GROUP | Age: 51
End: 2021-06-03

## 2021-06-22 ENCOUNTER — HOSPITAL ENCOUNTER (OUTPATIENT)
Dept: LAB | Facility: MEDICAL CENTER | Age: 51
End: 2021-06-22
Attending: FAMILY MEDICINE
Payer: COMMERCIAL

## 2021-06-22 DIAGNOSIS — E11.9 DIABETES MELLITUS WITHOUT COMPLICATION (HCC): ICD-10-CM

## 2021-06-22 DIAGNOSIS — E78.5 DYSLIPIDEMIA: ICD-10-CM

## 2021-06-22 LAB
ALBUMIN SERPL BCP-MCNC: 4.1 G/DL (ref 3.2–4.9)
ALBUMIN/GLOB SERPL: 1.4 G/DL
ALP SERPL-CCNC: 72 U/L (ref 30–99)
ALT SERPL-CCNC: 16 U/L (ref 2–50)
ANION GAP SERPL CALC-SCNC: 9 MMOL/L (ref 7–16)
AST SERPL-CCNC: 22 U/L (ref 12–45)
BILIRUB SERPL-MCNC: 0.6 MG/DL (ref 0.1–1.5)
BUN SERPL-MCNC: 18 MG/DL (ref 8–22)
CALCIUM SERPL-MCNC: 8.9 MG/DL (ref 8.5–10.5)
CHLORIDE SERPL-SCNC: 107 MMOL/L (ref 96–112)
CHOLEST SERPL-MCNC: 142 MG/DL (ref 100–199)
CO2 SERPL-SCNC: 23 MMOL/L (ref 20–33)
CREAT SERPL-MCNC: 1.04 MG/DL (ref 0.5–1.4)
EST. AVERAGE GLUCOSE BLD GHB EST-MCNC: 120 MG/DL
FASTING STATUS PATIENT QL REPORTED: NORMAL
GLOBULIN SER CALC-MCNC: 2.9 G/DL (ref 1.9–3.5)
GLUCOSE SERPL-MCNC: 105 MG/DL (ref 65–99)
HBA1C MFR BLD: 5.8 % (ref 4–5.6)
HDLC SERPL-MCNC: 35 MG/DL
LDLC SERPL CALC-MCNC: 89 MG/DL
POTASSIUM SERPL-SCNC: 4.1 MMOL/L (ref 3.6–5.5)
PROT SERPL-MCNC: 7 G/DL (ref 6–8.2)
SODIUM SERPL-SCNC: 139 MMOL/L (ref 135–145)
TRIGL SERPL-MCNC: 88 MG/DL (ref 0–149)

## 2021-06-22 PROCEDURE — 83036 HEMOGLOBIN GLYCOSYLATED A1C: CPT

## 2021-06-22 PROCEDURE — 80061 LIPID PANEL: CPT

## 2021-06-22 PROCEDURE — 36415 COLL VENOUS BLD VENIPUNCTURE: CPT

## 2021-06-22 PROCEDURE — 80053 COMPREHEN METABOLIC PANEL: CPT

## 2021-06-24 ENCOUNTER — OFFICE VISIT (OUTPATIENT)
Dept: MEDICAL GROUP | Facility: PHYSICIAN GROUP | Age: 51
End: 2021-06-24
Payer: COMMERCIAL

## 2021-06-24 VITALS
DIASTOLIC BLOOD PRESSURE: 60 MMHG | TEMPERATURE: 97.9 F | OXYGEN SATURATION: 97 % | HEIGHT: 71 IN | SYSTOLIC BLOOD PRESSURE: 100 MMHG | HEART RATE: 56 BPM | WEIGHT: 214.73 LBS | BODY MASS INDEX: 30.06 KG/M2

## 2021-06-24 DIAGNOSIS — R73.01 IMPAIRED FASTING BLOOD SUGAR: ICD-10-CM

## 2021-06-24 DIAGNOSIS — M79.672 LEFT FOOT PAIN: ICD-10-CM

## 2021-06-24 DIAGNOSIS — Z12.11 SCREEN FOR COLON CANCER: ICD-10-CM

## 2021-06-24 DIAGNOSIS — Z12.5 SCREENING FOR PROSTATE CANCER: ICD-10-CM

## 2021-06-24 DIAGNOSIS — K62.89 RECTAL MASS: ICD-10-CM

## 2021-06-24 DIAGNOSIS — E78.5 DYSLIPIDEMIA: ICD-10-CM

## 2021-06-24 PROCEDURE — 99214 OFFICE O/P EST MOD 30 MIN: CPT | Performed by: FAMILY MEDICINE

## 2021-06-24 ASSESSMENT — FIBROSIS 4 INDEX: FIB4 SCORE: .8928571428571428571

## 2021-06-24 NOTE — PROGRESS NOTES
"Subjective:      Elder Starks is a 51 y.o. male who presents with Diabetes            HPI     Patient is here for follow-up of his medical problems as well as for new concern.    We have been following him for impaired fasting blood sugar his hemoglobin A1c went up to 6.6 which were already in the diabetic level.  In October 2020 and then to 6.7 in February 2021.  I had him work harder on diet, exercise and weight loss to get this better managed.  He has lost 16 pounds in the last 4 months.    For his dyslipidemia, his LDL cholesterol increased from  in February 2021.  He did not want to start on statin.  He has modified his diet and has lost weight as mentioned above.  Blood work was done before this visit.    For his left foot pain, he was initially seen and evaluated by [facility] and his MRI showed small ankle OCD lesion and large tear posterior tibial tendon.  He did not respond to physical therapy and so he was recommended surgery.  He wanted second opinion and he has an appointment with Dr. Hsu with [facility] on July 16.  He still has pain in the foot as well as limping when he walks.  This limits his activity to do cardio exercises.    He complains of a lump in his rectum which is nonpainful.  He said this has been there for 2 weeks.  Denies any bleeding.  He said it started after he had bowel movement.  At that time he was constipated and he was straining.    Past medical history, past surgical history, family history reviewed-no changes    Social history reviewed-no changes    Allergies reviewed-no changes    Medications reviewed-no changes    ROS     As per HPI, the rest are negative.       Objective:     /60 (BP Location: Left arm, Patient Position: Sitting, BP Cuff Size: Adult)   Pulse (!) 56   Temp 36.6 °C (97.9 °F) (Temporal)   Ht 1.803 m (5' 11\")   Wt 97.4 kg (214 lb 11.7 oz)   SpO2 97%   BMI 29.95 kg/m²      Physical Exam     Examined alert, awake, oriented, not " in distress    Neck-supple, no lymphadenopathy, no thyromegaly  Lungs-clear to auscultation, no rales, no wheezes  Heart-regular rate and rhythm, no murmur  Extremities-no edema, clubbing, cyanosis  Rectal exam-large nontender skin colored mass occupying the whole left side of the perirectal area size of a marble, without any ulceration or open area, tight sphincter tone with no other palpable masses          Hospital Outpatient Visit on 06/22/2021   Component Date Value Ref Range Status   • Glycohemoglobin 06/22/2021 5.8* 4.0 - 5.6 % Final    Comment: Increased risk for diabetes:  5.7 -6.4%  Diabetes:  >6.4%  Glycemic control for adults with diabetes:  <7.0%    The above interpretations are per ADA guidelines.  Diagnosis  of diabetes mellitus on the basis of elevated Hemoglobin A1c  should be confirmed by repeating the Hb A1c test.     • Est Avg Glucose 06/22/2021 120  mg/dL Final    Comment: The eAG calculation is based on the A1c-Derived Daily Glucose  (ADAG) study.  See the ADA's website for additional information.     • Sodium 06/22/2021 139  135 - 145 mmol/L Final   • Potassium 06/22/2021 4.1  3.6 - 5.5 mmol/L Final   • Chloride 06/22/2021 107  96 - 112 mmol/L Final   • Co2 06/22/2021 23  20 - 33 mmol/L Final   • Anion Gap 06/22/2021 9.0  7.0 - 16.0 Final   • Glucose 06/22/2021 105* 65 - 99 mg/dL Final   • Bun 06/22/2021 18  8 - 22 mg/dL Final   • Creatinine 06/22/2021 1.04  0.50 - 1.40 mg/dL Final   • Calcium 06/22/2021 8.9  8.5 - 10.5 mg/dL Final   • AST(SGOT) 06/22/2021 22  12 - 45 U/L Final   • ALT(SGPT) 06/22/2021 16  2 - 50 U/L Final   • Alkaline Phosphatase 06/22/2021 72  30 - 99 U/L Final   • Total Bilirubin 06/22/2021 0.6  0.1 - 1.5 mg/dL Final   • Albumin 06/22/2021 4.1  3.2 - 4.9 g/dL Final   • Total Protein 06/22/2021 7.0  6.0 - 8.2 g/dL Final   • Globulin 06/22/2021 2.9  1.9 - 3.5 g/dL Final   • A-G Ratio 06/22/2021 1.4  g/dL Final   • Cholesterol,Tot 06/22/2021 142  100 - 199 mg/dL Final   •  Triglycerides 06/22/2021 88  0 - 149 mg/dL Final   • HDL 06/22/2021 35* >=40 mg/dL Final   • LDL 06/22/2021 89  <100 mg/dL Final   • Fasting Status 06/22/2021 Fasting   Final   • GFR If  06/22/2021 >60  >60 mL/min/1.73 m 2 Final   • GFR If Non  06/22/2021 >60  >60 mL/min/1.73 m 2 Final              Assessment/Plan:        1. Impaired fasting blood sugar  His hemoglobin A1c is down to 5.8 which is nondiabetic level.  I congratulated him on his efforts.  He will continue to manage his problem with diet, exercise and more weight loss.  We will recheck blood work in 4 months.  He has an appointment already for physical exam at that time.  - Lipid Profile; Future  - Comp Metabolic Panel; Future  - HEMOGLOBIN A1C; Future  - CBC WITH DIFFERENTIAL; Future  - PROSTATE SPECIFIC AG SCREENING; Future    2. Dyslipidemia  LDL is down below 100 which is now at goal.  HDL however dropped to 35.  He has difficulty doing cardio exercises because of his left foot pain.  Advised to try to increase his activity once the left foot problem is taken care of.  - Lipid Profile; Future  - Comp Metabolic Panel; Future  - HEMOGLOBIN A1C; Future  - CBC WITH DIFFERENTIAL; Future  - PROSTATE SPECIFIC AG SCREENING; Future    3. Left foot pain  He is seeking second opinion from another orthopedist Dr. Hsu and we will await recommendation.  - Lipid Profile; Future  - Comp Metabolic Panel; Future  - HEMOGLOBIN A1C; Future  - CBC WITH DIFFERENTIAL; Future  - PROSTATE SPECIFIC AG SCREENING; Future    4. Screening for prostate cancer  He just turned 51 and we will start doing screening PSA with the next blood work.  - Lipid Profile; Future  - Comp Metabolic Panel; Future  - HEMOGLOBIN A1C; Future  - CBC WITH DIFFERENTIAL; Future  - PROSTATE SPECIFIC AG SCREENING; Future    5. Rectal mass  He has a  large marble size rectal mass which is not consistent with hemorrhoid.  Referral placed to colorectal surgeon for further  evaluation and treatment.  - REFERRAL TO COLORECTAL SURGERY    6. BMI 30.0-30.9,adult  BMI has dropped from 32-30 but advised to continue to lose weight to get the BMI down below 30.     7.  Screen for colon cancer  He just turned 51.  We discussed referral for screening colonoscopy and he agrees to proceed.  Referral placed.    Return in September for his physical exam.

## 2021-10-28 ENCOUNTER — OFFICE VISIT (OUTPATIENT)
Dept: MEDICAL GROUP | Facility: PHYSICIAN GROUP | Age: 51
End: 2021-10-28
Payer: COMMERCIAL

## 2021-10-28 VITALS
DIASTOLIC BLOOD PRESSURE: 60 MMHG | HEART RATE: 71 BPM | TEMPERATURE: 97.7 F | OXYGEN SATURATION: 96 % | SYSTOLIC BLOOD PRESSURE: 100 MMHG | WEIGHT: 205.69 LBS | BODY MASS INDEX: 28.8 KG/M2 | HEIGHT: 71 IN

## 2021-10-28 DIAGNOSIS — Z23 NEED FOR IMMUNIZATION AGAINST INFLUENZA: ICD-10-CM

## 2021-10-28 DIAGNOSIS — E78.5 DYSLIPIDEMIA: ICD-10-CM

## 2021-10-28 DIAGNOSIS — Z00.00 ROUTINE PHYSICAL EXAMINATION: ICD-10-CM

## 2021-10-28 DIAGNOSIS — R73.01 IMPAIRED FASTING BLOOD SUGAR: ICD-10-CM

## 2021-10-28 DIAGNOSIS — Z12.5 SCREENING FOR PROSTATE CANCER: ICD-10-CM

## 2021-10-28 DIAGNOSIS — Z23 NEED FOR SHINGLES VACCINE: ICD-10-CM

## 2021-10-28 PROCEDURE — 99396 PREV VISIT EST AGE 40-64: CPT | Mod: 25 | Performed by: FAMILY MEDICINE

## 2021-10-28 PROCEDURE — 90686 IIV4 VACC NO PRSV 0.5 ML IM: CPT | Performed by: FAMILY MEDICINE

## 2021-10-28 PROCEDURE — 90750 HZV VACC RECOMBINANT IM: CPT | Performed by: FAMILY MEDICINE

## 2021-10-28 PROCEDURE — 90472 IMMUNIZATION ADMIN EACH ADD: CPT | Performed by: FAMILY MEDICINE

## 2021-10-28 PROCEDURE — 90471 IMMUNIZATION ADMIN: CPT | Performed by: FAMILY MEDICINE

## 2021-10-28 RX ORDER — ASPIRIN 325 MG
325 TABLET ORAL EVERY 6 HOURS PRN
COMMUNITY
End: 2023-01-25

## 2021-10-28 ASSESSMENT — FIBROSIS 4 INDEX: FIB4 SCORE: .8928571428571428571

## 2021-10-29 PROBLEM — M65.9: Status: ACTIVE | Noted: 2021-07-16

## 2021-10-29 PROBLEM — M65.969: Status: ACTIVE | Noted: 2021-07-16

## 2021-10-29 NOTE — PROGRESS NOTES
Subjective     Elder Starks is a 51 y.o. male who presents with Annual Exam (PE)            HPI     Patient is here for annual physical exam.    I have already referred him for screening colonoscopy in June 2021.  Patient still has to get this set up.  Last Tdap September 2016.  He needs flu shot and Shingrix today.    In the interim, he had left ankle surgery done by Dr. Hsu at [facility] on September 30 to repair tendon and stabilize the heel.  He is still of from work and is using and doing physical therapy.    He has dyslipidemia which he manages with his own efforts only.  The last lipid panel was in June 2021 that came back LDL was already down to goal from 108-89.  HDL however went down from 40-35.    We also follow him for impaired fasting blood sugar and his last blood work came back fasting blood glucose was 105 and hemoglobin A1c 5.8 in June 2021.  He is managing this by watching his diet.    I reviewed the following    Past Medical History:   Diagnosis Date   • Dyslipidemia 10/22/2020   • No significant past medical history         History reviewed. No pertinent surgical history.    No Known Allergies    Current Outpatient Medications   Medication Sig Dispense Refill   • aspirin (ASA) 325 MG Tab Take 325 mg by mouth every 6 hours as needed.     • indomethacin (INDOCIN) 50 MG Cap TAKE 1 CAPSULE BY MOUTH THREE TIMES DAILY WITH FOOD (Patient not taking: Reported on 6/24/2021)     • meloxicam (MOBIC) 15 MG tablet Take 1 Tab by mouth every day. (Patient not taking: Reported on 2/22/2021) 30 Tab 1     No current facility-administered medications for this visit.        Family History   Problem Relation Age of Onset   • No Known Problems Mother    • Other Father         BPH   • No Known Problems Sister    • No Known Problems Brother    • No Known Problems Brother        Social History     Socioeconomic History   • Marital status:      Spouse name: Not on file   • Number of children: 2  "  • Years of education: Not on file   • Highest education level: Not on file   Occupational History   • Occupation: [employer] management - [employer]     Employer: unknown   Tobacco Use   • Smoking status: Former Smoker     Packs/day: 0.10     Years: 4.00     Pack years: 0.40     Types: Cigarettes   • Smokeless tobacco: Never Used   • Tobacco comment: quit cigarettes at age 22   Vaping Use   • Vaping Use: Never used   Substance and Sexual Activity   • Alcohol use: Yes     Alcohol/week: 0.0 oz     Comment: occasional   • Drug use: No   • Sexual activity: Yes     Partners: Female   Other Topics Concern   • Not on file   Social History Narrative   • Not on file     Social Determinants of Health     Financial Resource Strain:    • Difficulty of Paying Living Expenses:    Food Insecurity:    • Worried About Running Out of Food in the Last Year:    • Ran Out of Food in the Last Year:    Transportation Needs:    • Lack of Transportation (Medical):    • Lack of Transportation (Non-Medical):    Physical Activity:    • Days of Exercise per Week:    • Minutes of Exercise per Session:    Stress:    • Feeling of Stress :    Social Connections:    • Frequency of Communication with Friends and Family:    • Frequency of Social Gatherings with Friends and Family:    • Attends Alevism Services:    • Active Member of Clubs or Organizations:    • Attends Club or Organization Meetings:    • Marital Status:    Intimate Partner Violence:    • Fear of Current or Ex-Partner:    • Emotionally Abused:    • Physically Abused:    • Sexually Abused:       ROS     As per HPI, the rest are negative.           Objective     /60 (BP Location: Left arm, Patient Position: Sitting, BP Cuff Size: Adult)   Pulse 71   Temp 36.5 °C (97.7 °F) (Temporal)   Ht 1.803 m (5' 11\")   Wt 93.3 kg (205 lb 11 oz)   SpO2 96%   BMI 28.69 kg/m²      Physical Exam  Vitals and nursing note reviewed.   Constitutional:       General: He is not in " acute distress.     Appearance: He is well-developed. He is not diaphoretic.   HENT:      Head: Normocephalic and atraumatic.      Right Ear: External ear normal.      Left Ear: External ear normal.      Nose: Nose normal.      Mouth/Throat:      Pharynx: No oropharyngeal exudate.   Eyes:      General: No scleral icterus.        Right eye: No discharge.         Left eye: No discharge.      Conjunctiva/sclera: Conjunctivae normal.      Pupils: Pupils are equal, round, and reactive to light.   Neck:      Thyroid: No thyromegaly.      Vascular: No JVD.      Trachea: No tracheal deviation.   Cardiovascular:      Rate and Rhythm: Normal rate and regular rhythm.      Heart sounds: Normal heart sounds. No murmur heard.   No friction rub. No gallop.    Pulmonary:      Effort: Pulmonary effort is normal. No respiratory distress.      Breath sounds: Normal breath sounds. No stridor. No wheezing or rales.   Chest:      Chest wall: No tenderness.   Abdominal:      General: Bowel sounds are normal. There is no distension.      Palpations: Abdomen is soft. There is no mass.      Tenderness: There is no abdominal tenderness. There is no guarding or rebound.      Hernia: No hernia is present. There is no hernia in the left inguinal area or right inguinal area.   Genitourinary:     Pubic Area: No rash.       Penis: Normal and uncircumcised. No phimosis, paraphimosis, erythema, tenderness, discharge, swelling or lesions.       Testes: Normal.         Right: Mass, tenderness or swelling not present. Right testis is descended.         Left: Mass, tenderness or swelling not present. Left testis is descended.      Epididymis:      Right: Normal.      Left: Normal.      Prostate: Normal. Not enlarged and not tender.      Rectum: Normal. Guaiac result negative. No mass, tenderness, anal fissure, external hemorrhoid or internal hemorrhoid. Normal anal tone.   Musculoskeletal:         General: No tenderness or deformity. Normal range of  motion.      Cervical back: Normal range of motion and neck supple.      Comments: Left foot and ankle with dressing in place and wrapped in Ace bandage.  Toes are exposed without any redness or swelling.  Able to wiggle the toes.   Lymphadenopathy:      Cervical: No cervical adenopathy.      Lower Body: No right inguinal adenopathy. No left inguinal adenopathy.   Skin:     General: Skin is warm and dry.      Coloration: Skin is not pale.      Findings: No erythema or rash.   Neurological:      Mental Status: He is alert and oriented to person, place, and time.      Cranial Nerves: No cranial nerve deficit.      Motor: No abnormal muscle tone.      Coordination: Coordination normal.      Deep Tendon Reflexes: Reflexes are normal and symmetric. Reflexes normal.   Psychiatric:         Behavior: Behavior normal.         Thought Content: Thought content normal.         Judgment: Judgment normal.        A chaperone was offered to the patient during today's exam. Chaperone name: Juan Valenzuela was present.                        Assessment & Plan        1. Routine physical examination  Complete exam was done.  Up-to-date with Tdap.  He already got 2 doses of COVID-19 vaccine.  He was given flu shot and first dose of Shingrix today.  He will get his colonoscopy done 6 to 8 weeks from his ankle surgery.    2. Need for immunization against influenza  Flu shot was given today.  - INFLUENZA VACCINE QUAD INJ (PF)    3. Need for shingles vaccine  First dose of Shingrix given.  He will get the second dose in 2 months.  Made aware of potential side effects and how he can manage them.  - Shingles Vaccine (Shingrix)    4.  Dyslipidemia  He will get updated blood work.  This is managed with his own efforts.    5.  Impaired fasting blood sugar  He will do updated blood work.  He will continue to manage this by watching his diet.    6.  Screening for prostate cancer  He already has blood work in the system and he will get this  done.    Return for follow-up in 6 months.         Please note that this dictation was created using voice recognition software. I have worked with consultants from the vendor as well as technical experts from [facility] to optimize the interface. I have made every reasonable attempt to correct obvious errors, but I expect that there are errors of grammar and possibly content I did not discover before finalizing the note.

## 2021-11-23 ENCOUNTER — HOSPITAL ENCOUNTER (OUTPATIENT)
Dept: LAB | Facility: MEDICAL CENTER | Age: 51
End: 2021-11-23
Attending: FAMILY MEDICINE
Payer: COMMERCIAL

## 2021-11-23 DIAGNOSIS — M79.672 LEFT FOOT PAIN: ICD-10-CM

## 2021-11-23 DIAGNOSIS — R73.01 IMPAIRED FASTING BLOOD SUGAR: ICD-10-CM

## 2021-11-23 DIAGNOSIS — Z12.5 SCREENING FOR PROSTATE CANCER: ICD-10-CM

## 2021-11-23 DIAGNOSIS — E78.5 DYSLIPIDEMIA: ICD-10-CM

## 2021-11-23 LAB
ALBUMIN SERPL BCP-MCNC: 4.7 G/DL (ref 3.2–4.9)
ALBUMIN/GLOB SERPL: 1.9 G/DL
ALP SERPL-CCNC: 63 U/L (ref 30–99)
ALT SERPL-CCNC: 16 U/L (ref 2–50)
ANION GAP SERPL CALC-SCNC: 10 MMOL/L (ref 7–16)
AST SERPL-CCNC: 17 U/L (ref 12–45)
BASOPHILS # BLD AUTO: 0.8 % (ref 0–1.8)
BASOPHILS # BLD: 0.05 K/UL (ref 0–0.12)
BILIRUB SERPL-MCNC: 0.6 MG/DL (ref 0.1–1.5)
BUN SERPL-MCNC: 17 MG/DL (ref 8–22)
CALCIUM SERPL-MCNC: 9.4 MG/DL (ref 8.5–10.5)
CHLORIDE SERPL-SCNC: 103 MMOL/L (ref 96–112)
CHOLEST SERPL-MCNC: 168 MG/DL (ref 100–199)
CO2 SERPL-SCNC: 25 MMOL/L (ref 20–33)
CREAT SERPL-MCNC: 0.94 MG/DL (ref 0.5–1.4)
EOSINOPHIL # BLD AUTO: 0.2 K/UL (ref 0–0.51)
EOSINOPHIL NFR BLD: 3.1 % (ref 0–6.9)
ERYTHROCYTE [DISTWIDTH] IN BLOOD BY AUTOMATED COUNT: 48 FL (ref 35.9–50)
EST. AVERAGE GLUCOSE BLD GHB EST-MCNC: 117 MG/DL
FASTING STATUS PATIENT QL REPORTED: NORMAL
GLOBULIN SER CALC-MCNC: 2.5 G/DL (ref 1.9–3.5)
GLUCOSE SERPL-MCNC: 106 MG/DL (ref 65–99)
HBA1C MFR BLD: 5.7 % (ref 4–5.6)
HCT VFR BLD AUTO: 44.2 % (ref 42–52)
HDLC SERPL-MCNC: 40 MG/DL
HGB BLD-MCNC: 14.5 G/DL (ref 14–18)
IMM GRANULOCYTES # BLD AUTO: 0.05 K/UL (ref 0–0.11)
IMM GRANULOCYTES NFR BLD AUTO: 0.8 % (ref 0–0.9)
LDLC SERPL CALC-MCNC: 106 MG/DL
LYMPHOCYTES # BLD AUTO: 1.92 K/UL (ref 1–4.8)
LYMPHOCYTES NFR BLD: 30 % (ref 22–41)
MCH RBC QN AUTO: 28.6 PG (ref 27–33)
MCHC RBC AUTO-ENTMCNC: 32.8 G/DL (ref 33.7–35.3)
MCV RBC AUTO: 87.2 FL (ref 81.4–97.8)
MONOCYTES # BLD AUTO: 0.65 K/UL (ref 0–0.85)
MONOCYTES NFR BLD AUTO: 10.1 % (ref 0–13.4)
NEUTROPHILS # BLD AUTO: 3.54 K/UL (ref 1.82–7.42)
NEUTROPHILS NFR BLD: 55.2 % (ref 44–72)
NRBC # BLD AUTO: 0 K/UL
NRBC BLD-RTO: 0 /100 WBC
PLATELET # BLD AUTO: 307 K/UL (ref 164–446)
PMV BLD AUTO: 10.9 FL (ref 9–12.9)
POTASSIUM SERPL-SCNC: 4.5 MMOL/L (ref 3.6–5.5)
PROT SERPL-MCNC: 7.2 G/DL (ref 6–8.2)
PSA SERPL-MCNC: 0.69 NG/ML (ref 0–4)
RBC # BLD AUTO: 5.07 M/UL (ref 4.7–6.1)
SODIUM SERPL-SCNC: 138 MMOL/L (ref 135–145)
TRIGL SERPL-MCNC: 108 MG/DL (ref 0–149)
WBC # BLD AUTO: 6.4 K/UL (ref 4.8–10.8)

## 2021-11-23 PROCEDURE — 36415 COLL VENOUS BLD VENIPUNCTURE: CPT

## 2021-11-23 PROCEDURE — 83036 HEMOGLOBIN GLYCOSYLATED A1C: CPT

## 2021-11-23 PROCEDURE — 80061 LIPID PANEL: CPT

## 2021-11-23 PROCEDURE — 85025 COMPLETE CBC W/AUTO DIFF WBC: CPT

## 2021-11-23 PROCEDURE — 80053 COMPREHEN METABOLIC PANEL: CPT

## 2021-11-23 PROCEDURE — 84153 ASSAY OF PSA TOTAL: CPT

## 2022-04-22 DIAGNOSIS — E78.5 DYSLIPIDEMIA: ICD-10-CM

## 2022-04-22 DIAGNOSIS — R73.01 IMPAIRED FASTING BLOOD SUGAR: ICD-10-CM

## 2022-04-26 ENCOUNTER — HOSPITAL ENCOUNTER (OUTPATIENT)
Dept: LAB | Facility: MEDICAL CENTER | Age: 52
End: 2022-04-26
Attending: FAMILY MEDICINE
Payer: COMMERCIAL

## 2022-04-26 DIAGNOSIS — R73.01 IMPAIRED FASTING BLOOD SUGAR: ICD-10-CM

## 2022-04-26 DIAGNOSIS — E78.5 DYSLIPIDEMIA: ICD-10-CM

## 2022-04-26 LAB
ALBUMIN SERPL BCP-MCNC: 4.3 G/DL (ref 3.2–4.9)
ALBUMIN/GLOB SERPL: 1.5 G/DL
ALP SERPL-CCNC: 66 U/L (ref 30–99)
ALT SERPL-CCNC: 21 U/L (ref 2–50)
ANION GAP SERPL CALC-SCNC: 9 MMOL/L (ref 7–16)
AST SERPL-CCNC: 27 U/L (ref 12–45)
BILIRUB SERPL-MCNC: 0.5 MG/DL (ref 0.1–1.5)
BUN SERPL-MCNC: 18 MG/DL (ref 8–22)
CALCIUM SERPL-MCNC: 9.3 MG/DL (ref 8.5–10.5)
CHLORIDE SERPL-SCNC: 104 MMOL/L (ref 96–112)
CHOLEST SERPL-MCNC: 172 MG/DL (ref 100–199)
CO2 SERPL-SCNC: 26 MMOL/L (ref 20–33)
CREAT SERPL-MCNC: 0.94 MG/DL (ref 0.5–1.4)
EST. AVERAGE GLUCOSE BLD GHB EST-MCNC: 117 MG/DL
FASTING STATUS PATIENT QL REPORTED: NORMAL
GFR SERPLBLD CREATININE-BSD FMLA CKD-EPI: 98 ML/MIN/1.73 M 2
GLOBULIN SER CALC-MCNC: 2.8 G/DL (ref 1.9–3.5)
GLUCOSE SERPL-MCNC: 108 MG/DL (ref 65–99)
HBA1C MFR BLD: 5.7 % (ref 4–5.6)
HDLC SERPL-MCNC: 35 MG/DL
LDLC SERPL CALC-MCNC: 115 MG/DL
POTASSIUM SERPL-SCNC: 4.3 MMOL/L (ref 3.6–5.5)
PROT SERPL-MCNC: 7.1 G/DL (ref 6–8.2)
SODIUM SERPL-SCNC: 139 MMOL/L (ref 135–145)
TRIGL SERPL-MCNC: 112 MG/DL (ref 0–149)

## 2022-04-26 PROCEDURE — 80061 LIPID PANEL: CPT

## 2022-04-26 PROCEDURE — 80053 COMPREHEN METABOLIC PANEL: CPT

## 2022-04-26 PROCEDURE — 36415 COLL VENOUS BLD VENIPUNCTURE: CPT

## 2022-04-26 PROCEDURE — 83036 HEMOGLOBIN GLYCOSYLATED A1C: CPT

## 2022-04-28 ENCOUNTER — OFFICE VISIT (OUTPATIENT)
Dept: MEDICAL GROUP | Facility: PHYSICIAN GROUP | Age: 52
End: 2022-04-28
Payer: COMMERCIAL

## 2022-04-28 VITALS
HEIGHT: 71 IN | DIASTOLIC BLOOD PRESSURE: 70 MMHG | HEART RATE: 60 BPM | BODY MASS INDEX: 30.65 KG/M2 | WEIGHT: 218.92 LBS | OXYGEN SATURATION: 96 % | TEMPERATURE: 97.2 F | SYSTOLIC BLOOD PRESSURE: 100 MMHG

## 2022-04-28 DIAGNOSIS — Z23 NEED FOR SHINGLES VACCINE: ICD-10-CM

## 2022-04-28 DIAGNOSIS — R73.01 IMPAIRED FASTING BLOOD SUGAR: ICD-10-CM

## 2022-04-28 DIAGNOSIS — M79.671 RIGHT FOOT PAIN: ICD-10-CM

## 2022-04-28 DIAGNOSIS — E78.5 DYSLIPIDEMIA: ICD-10-CM

## 2022-04-28 DIAGNOSIS — R22.2 SUBCUTANEOUS MASS OF BACK: ICD-10-CM

## 2022-04-28 DIAGNOSIS — Z12.5 SCREENING FOR PROSTATE CANCER: ICD-10-CM

## 2022-04-28 PROCEDURE — 99214 OFFICE O/P EST MOD 30 MIN: CPT | Performed by: FAMILY MEDICINE

## 2022-04-28 RX ORDER — ZOSTER VACCINE RECOMBINANT, ADJUVANTED 50 MCG/0.5
0.5 KIT INTRAMUSCULAR ONCE
Qty: 1 EACH | Refills: 0 | Status: SHIPPED | OUTPATIENT
Start: 2022-04-28 | End: 2022-04-28

## 2022-04-28 ASSESSMENT — PATIENT HEALTH QUESTIONNAIRE - PHQ9: CLINICAL INTERPRETATION OF PHQ2 SCORE: 0

## 2022-04-28 ASSESSMENT — FIBROSIS 4 INDEX: FIB4 SCORE: 0.96

## 2022-04-28 NOTE — PROGRESS NOTES
"Subjective     Elder Starks is a 51 y.o. male who presents with Follow-Up and Pain (Heel pain)            HPI   Patient returns for follow-up of his medical problems accompanied by his wife.    For his dyslipidemia he is managing this with his own efforts only.  He however gained 13 pounds in the last 6 months.    We follow him for impaired fasting blood sugar and he is managing this by watching his diet.    He has been having right foot pain specifically the heel area recently.  Patient had surgery for posterior tibialis tenosynovitis back in September 2021 done by Dr. Hsu.  Dr. Hsu told him that this may be because he has been favoring his right foot after surgery and during recovery.  He said he will go back and see his specialist to get this further looked since this is not improved.    He wants me to recheck a small bump on the mid back in the upper thoracic area.  No pain or discomfort.    He got his first dose of shingles vaccine here in the office in October 2021.  We however do not have the vaccine anymore.    Past medical history, past surgical history, family history reviewed-no changes    Social history reviewed-no changes    Allergies reviewed-no changes    Medications reviewed-no changes        ROS     As per HPI, the rest are negative.           Objective     /70 (BP Location: Left arm, Patient Position: Sitting, BP Cuff Size: Large adult)   Pulse 60   Temp 36.2 °C (97.2 °F) (Temporal)   Ht 1.803 m (5' 11\")   Wt 99.3 kg (218 lb 14.7 oz)   SpO2 96%   BMI 30.53 kg/m²      Physical Exam      Examined alert, awake, oriented, not in distress    Neck-supple, no lymphadenopathy, no thyromegaly  Lungs-clear to auscultation, no rales, no wheezes  Heart-regular rate and rhythm, no murmur  Extremities-no edema, clubbing, cyanosis           Hospital Outpatient Visit on 04/26/2022   Component Date Value Ref Range Status   • Glycohemoglobin 04/26/2022 5.7 (A) 4.0 - 5.6 % Final    Comment: " Increased risk for diabetes:  5.7 -6.4%  Diabetes:  >6.4%  Glycemic control for adults with diabetes:  <7.0%    The above interpretations are per ADA guidelines.  Diagnosis  of diabetes mellitus on the basis of elevated Hemoglobin A1c  should be confirmed by repeating the Hb A1c test.     • Est Avg Glucose 04/26/2022 117  mg/dL Final    Comment: The eAG calculation is based on the A1c-Derived Daily Glucose  (ADAG) study.  See the ADA's website for additional information.     • Sodium 04/26/2022 139  135 - 145 mmol/L Final   • Potassium 04/26/2022 4.3  3.6 - 5.5 mmol/L Final   • Chloride 04/26/2022 104  96 - 112 mmol/L Final   • Co2 04/26/2022 26  20 - 33 mmol/L Final   • Anion Gap 04/26/2022 9.0  7.0 - 16.0 Final   • Glucose 04/26/2022 108 (A) 65 - 99 mg/dL Final   • Bun 04/26/2022 18  8 - 22 mg/dL Final   • Creatinine 04/26/2022 0.94  0.50 - 1.40 mg/dL Final   • Calcium 04/26/2022 9.3  8.5 - 10.5 mg/dL Final   • AST(SGOT) 04/26/2022 27  12 - 45 U/L Final   • ALT(SGPT) 04/26/2022 21  2 - 50 U/L Final   • Alkaline Phosphatase 04/26/2022 66  30 - 99 U/L Final   • Total Bilirubin 04/26/2022 0.5  0.1 - 1.5 mg/dL Final   • Albumin 04/26/2022 4.3  3.2 - 4.9 g/dL Final   • Total Protein 04/26/2022 7.1  6.0 - 8.2 g/dL Final   • Globulin 04/26/2022 2.8  1.9 - 3.5 g/dL Final   • A-G Ratio 04/26/2022 1.5  g/dL Final   • Cholesterol,Tot 04/26/2022 172  100 - 199 mg/dL Final   • Triglycerides 04/26/2022 112  0 - 149 mg/dL Final   • HDL 04/26/2022 35 (A) >=40 mg/dL Final   • LDL 04/26/2022 115 (A) <100 mg/dL Final   • Fasting Status 04/26/2022 Fasting   Final   • GFR (CKD-EPI) 04/26/2022 98  >60 mL/min/1.73 m 2 Final    Comment: Effective 3/15/2022, estimated Glomerular Filtration Rate  is calculated using race neutral CKD-EPI 2021 equation  per NKF-ASN recommendations.                The 10-year ASCVD risk score (Hendersoncristian ROBLEDO Jr., et al., 2013) is: 2.7%    Assessment & Plan        1. Dyslipidemia  LDL cholesterol is higher than  before.  HDL went down and is now below 40.  Advised to increase activity with regular exercises to improve the HDL.  Advised work harder on decreasing the fat in the diet.  Advised weight loss.  We do not need to treat him with statin as attenuated CVD risk or is at 2.7%.  He was given a diet sheet to follow.  Recheck blood work in 6 months.  - Lipid Profile; Future  - Comp Metabolic Panel; Future  - HEMOGLOBIN A1C; Future  - CBC WITH DIFFERENTIAL; Future  - PROSTATE SPECIFIC AG SCREENING; Future    2. Impaired fasting blood sugar  Fasting blood sugar and hemoglobin A1c consistent with prediabetes but advised avoid sweets and decreasing carbs.  Advised regular exercises and weight loss.  Recheck blood work in 6 months.  - Lipid Profile; Future  - Comp Metabolic Panel; Future  - HEMOGLOBIN A1C; Future  - CBC WITH DIFFERENTIAL; Future  - PROSTATE SPECIFIC AG SCREENING; Future    3. Right foot pain  He will go back and see his orthopedist to get this further evaluated and treated.  - Lipid Profile; Future  - Comp Metabolic Panel; Future  - HEMOGLOBIN A1C; Future  - CBC WITH DIFFERENTIAL; Future  - PROSTATE SPECIFIC AG SCREENING; Future    4. Screening for prostate cancer  We will do screening PSA next visit.  - Lipid Profile; Future  - Comp Metabolic Panel; Future  - HEMOGLOBIN A1C; Future  - CBC WITH DIFFERENTIAL; Future  - PROSTATE SPECIFIC AG SCREENING; Future    5. Need for shingles vaccine  He was given prescription to get a second dose of Shingrix vaccine from the pharmacy as this is not available in our office.  - Zoster Vac Recomb Adjuvanted (SHINGRIX) 50 MCG/0.5ML Recon Susp; Inject 0.5 mL into the shoulder, thigh, or buttocks one time for 1 dose.  Dispense: 1 Each; Refill: 0    Follow-up in 6 months and we will do physical exam at that time.      Please note that this dictation was created using voice recognition software. I have worked with consultants from the vendor as well as technical experts from  [facility] to optimize the interface. I have made every reasonable attempt to correct obvious errors, but I expect that there are errors of grammar and possibly content I did not discover before finalizing the note.

## 2022-04-28 NOTE — PATIENT INSTRUCTIONS
Cholesterol  Cholesterol is a white, waxy, fat-like substance needed by your body in small amounts. The liver makes all the cholesterol you need. Cholesterol is carried from the liver by the blood through the blood vessels. Deposits of cholesterol (plaque) may build up on blood vessel walls. These make the arteries narrower and stiffer. Cholesterol plaques increase the risk for heart attack and stroke.   You cannot feel your cholesterol level even if it is very high. The only way to know it is high is with a blood test. Once you know your cholesterol levels, you should keep a record of the test results. Work with your health care provider to keep your levels in the desired range.   WHAT DO THE RESULTS MEAN?  · Total cholesterol is a rough measure of all the cholesterol in your blood.    · LDL is the so-called bad cholesterol. This is the type that deposits cholesterol in the walls of the arteries. You want this level to be low.    · HDL is the good cholesterol because it cleans the arteries and carries the LDL away. You want this level to be high.  · Triglycerides are fat that the body can either burn for energy or store. High levels are closely linked to heart disease.    WHAT ARE THE DESIRED LEVELS OF CHOLESTEROL?  · Total cholesterol below 200.    · LDL below 100 for people at risk, below 70 for those at very high risk.    · HDL above 50 is good, above 60 is best.    · Triglycerides below 150.    HOW CAN I LOWER MY CHOLESTEROL?  · Diet. Follow your diet programs as directed by your health care provider.    ¨ Choose fish or white meat chicken and turkey, roasted or baked. Limit fatty cuts of red meat, fried foods, and processed meats, such as sausage and lunch meats.    ¨ Eat lots of fresh fruits and vegetables.  ¨ Choose whole grains, beans, pasta, potatoes, and cereals.    ¨ Use only small amounts of olive, corn, or canola oils.    ¨ Avoid butter, mayonnaise, shortening, or palm kernel oils.  ¨ Avoid foods with  trans fats.    ¨ Drink skim or nonfat milk and eat low-fat or nonfat yogurt and cheeses. Avoid whole milk, cream, ice cream, egg yolks, and full-fat cheeses.    ¨ Healthy desserts include julissa food cake, taj snaps, animal crackers, hard candy, popsicles, and low-fat or nonfat frozen yogurt. Avoid pastries, cakes, pies, and cookies.    · Exercise. Follow your exercise programs as directed by your health care provider.    ¨ A regular program helps decrease LDL and raise HDL.    ¨ A regular program helps with weight control.    ¨ Do things that increase your activity level like gardening, walking, or taking the stairs. Ask your health care provider about how you can be more active in your daily life.    · Medicine. Take medicine only as directed by your health care provider.    ¨ Medicine may be prescribed by your health care provider to help lower cholesterol and decrease the risk for heart disease.    ¨ If you have several risk factors, you may need medicine even if your levels are normal.     This information is not intended to replace advice given to you by your health care provider. Make sure you discuss any questions you have with your health care provider.     Document Released: 09/12/2002 Document Revised: 01/08/2016 Document Reviewed: 10/01/2014  [employer] Interactive Patient Education ©2016 [employer]

## 2022-08-25 ENCOUNTER — HOSPITAL ENCOUNTER (OUTPATIENT)
Dept: LAB | Facility: MEDICAL CENTER | Age: 52
End: 2022-08-25
Attending: FAMILY MEDICINE
Payer: COMMERCIAL

## 2022-08-25 DIAGNOSIS — M79.671 RIGHT FOOT PAIN: ICD-10-CM

## 2022-08-25 DIAGNOSIS — E78.5 DYSLIPIDEMIA: ICD-10-CM

## 2022-08-25 DIAGNOSIS — Z12.5 SCREENING FOR PROSTATE CANCER: ICD-10-CM

## 2022-08-25 DIAGNOSIS — R73.01 IMPAIRED FASTING BLOOD SUGAR: ICD-10-CM

## 2022-08-25 LAB
ALBUMIN SERPL BCP-MCNC: 4.5 G/DL (ref 3.2–4.9)
ALBUMIN/GLOB SERPL: 1.7 G/DL
ALP SERPL-CCNC: 53 U/L (ref 30–99)
ALT SERPL-CCNC: 15 U/L (ref 2–50)
ANION GAP SERPL CALC-SCNC: 11 MMOL/L (ref 7–16)
AST SERPL-CCNC: 14 U/L (ref 12–45)
BASOPHILS # BLD AUTO: 0.6 % (ref 0–1.8)
BASOPHILS # BLD: 0.04 K/UL (ref 0–0.12)
BILIRUB SERPL-MCNC: 0.6 MG/DL (ref 0.1–1.5)
BUN SERPL-MCNC: 16 MG/DL (ref 8–22)
CALCIUM SERPL-MCNC: 9.5 MG/DL (ref 8.5–10.5)
CHLORIDE SERPL-SCNC: 105 MMOL/L (ref 96–112)
CHOLEST SERPL-MCNC: 212 MG/DL (ref 100–199)
CO2 SERPL-SCNC: 25 MMOL/L (ref 20–33)
CREAT SERPL-MCNC: 0.97 MG/DL (ref 0.5–1.4)
EOSINOPHIL # BLD AUTO: 0.05 K/UL (ref 0–0.51)
EOSINOPHIL NFR BLD: 0.7 % (ref 0–6.9)
ERYTHROCYTE [DISTWIDTH] IN BLOOD BY AUTOMATED COUNT: 51.7 FL (ref 35.9–50)
EST. AVERAGE GLUCOSE BLD GHB EST-MCNC: 120 MG/DL
FASTING STATUS PATIENT QL REPORTED: NORMAL
GFR SERPLBLD CREATININE-BSD FMLA CKD-EPI: 94 ML/MIN/1.73 M 2
GLOBULIN SER CALC-MCNC: 2.6 G/DL (ref 1.9–3.5)
GLUCOSE SERPL-MCNC: 103 MG/DL (ref 65–99)
HBA1C MFR BLD: 5.8 % (ref 4–5.6)
HCT VFR BLD AUTO: 45.4 % (ref 42–52)
HDLC SERPL-MCNC: 48 MG/DL
HGB BLD-MCNC: 14.7 G/DL (ref 14–18)
IMM GRANULOCYTES # BLD AUTO: 0.09 K/UL (ref 0–0.11)
IMM GRANULOCYTES NFR BLD AUTO: 1.3 % (ref 0–0.9)
LDLC SERPL CALC-MCNC: 137 MG/DL
LYMPHOCYTES # BLD AUTO: 2.02 K/UL (ref 1–4.8)
LYMPHOCYTES NFR BLD: 28.5 % (ref 22–41)
MCH RBC QN AUTO: 29.3 PG (ref 27–33)
MCHC RBC AUTO-ENTMCNC: 32.4 G/DL (ref 33.7–35.3)
MCV RBC AUTO: 90.6 FL (ref 81.4–97.8)
MONOCYTES # BLD AUTO: 0.61 K/UL (ref 0–0.85)
MONOCYTES NFR BLD AUTO: 8.6 % (ref 0–13.4)
NEUTROPHILS # BLD AUTO: 4.27 K/UL (ref 1.82–7.42)
NEUTROPHILS NFR BLD: 60.3 % (ref 44–72)
NRBC # BLD AUTO: 0 K/UL
NRBC BLD-RTO: 0 /100 WBC
PLATELET # BLD AUTO: 297 K/UL (ref 164–446)
PMV BLD AUTO: 11 FL (ref 9–12.9)
POTASSIUM SERPL-SCNC: 4.4 MMOL/L (ref 3.6–5.5)
PROT SERPL-MCNC: 7.1 G/DL (ref 6–8.2)
PSA SERPL-MCNC: 0.77 NG/ML (ref 0–4)
RBC # BLD AUTO: 5.01 M/UL (ref 4.7–6.1)
SODIUM SERPL-SCNC: 141 MMOL/L (ref 135–145)
TRIGL SERPL-MCNC: 134 MG/DL (ref 0–149)
WBC # BLD AUTO: 7.1 K/UL (ref 4.8–10.8)

## 2022-08-25 PROCEDURE — 85025 COMPLETE CBC W/AUTO DIFF WBC: CPT

## 2022-08-25 PROCEDURE — 84153 ASSAY OF PSA TOTAL: CPT

## 2022-08-25 PROCEDURE — 80053 COMPREHEN METABOLIC PANEL: CPT

## 2022-08-25 PROCEDURE — 83036 HEMOGLOBIN GLYCOSYLATED A1C: CPT

## 2022-08-25 PROCEDURE — 36415 COLL VENOUS BLD VENIPUNCTURE: CPT

## 2022-08-25 PROCEDURE — 80061 LIPID PANEL: CPT

## 2022-11-22 ENCOUNTER — OFFICE VISIT (OUTPATIENT)
Dept: URGENT CARE | Facility: PHYSICIAN GROUP | Age: 52
End: 2022-11-22
Payer: COMMERCIAL

## 2022-11-22 VITALS
OXYGEN SATURATION: 100 % | RESPIRATION RATE: 16 BRPM | SYSTOLIC BLOOD PRESSURE: 128 MMHG | BODY MASS INDEX: 30.1 KG/M2 | TEMPERATURE: 98.1 F | HEART RATE: 78 BPM | WEIGHT: 215 LBS | HEIGHT: 71 IN | DIASTOLIC BLOOD PRESSURE: 80 MMHG

## 2022-11-22 DIAGNOSIS — J02.9 SORE THROAT: ICD-10-CM

## 2022-11-22 DIAGNOSIS — J02.0 STREP PHARYNGITIS: ICD-10-CM

## 2022-11-22 LAB
INT CON NEG: NEGATIVE
INT CON POS: POSITIVE
S PYO AG THROAT QL: POSITIVE

## 2022-11-22 PROCEDURE — 87880 STREP A ASSAY W/OPTIC: CPT | Performed by: NURSE PRACTITIONER

## 2022-11-22 PROCEDURE — 99213 OFFICE O/P EST LOW 20 MIN: CPT | Performed by: NURSE PRACTITIONER

## 2022-11-22 RX ORDER — AMOXICILLIN 500 MG/1
500 CAPSULE ORAL 2 TIMES DAILY
Qty: 20 CAPSULE | Refills: 0 | Status: SHIPPED | OUTPATIENT
Start: 2022-11-22 | End: 2022-12-02

## 2022-11-22 ASSESSMENT — ENCOUNTER SYMPTOMS
WEAKNESS: 0
VOMITING: 0
NECK PAIN: 0
EYE DISCHARGE: 0
WHEEZING: 0
CHILLS: 0
MYALGIAS: 0
HEADACHES: 0
SINUS PAIN: 0
SORE THROAT: 1
CARDIOVASCULAR NEGATIVE: 1
EYE REDNESS: 0
DIARRHEA: 0
SHORTNESS OF BREATH: 0
FEVER: 0
CONSTIPATION: 0
ABDOMINAL PAIN: 0
DIZZINESS: 0
COUGH: 1
NAUSEA: 0

## 2022-11-22 ASSESSMENT — FIBROSIS 4 INDEX: FIB4 SCORE: 0.62

## 2022-11-22 NOTE — PROGRESS NOTES
Subjective     Elder Starks is a 52 y.o. male who presents with Pharyngitis (Onset 2 days) and Nasal Congestion (Green mucus drainage)            Pharyngitis   Associated symptoms include congestion and coughing. Pertinent negatives include no abdominal pain, diarrhea, ear pain, headaches, neck pain, shortness of breath or vomiting. Experiencing sore throat, nasal congestion x 2 days.  Denies fever, facial pressure or ear pain.  Mild cough with postnasal drainage.  Denies malaise or body aches.  No nasal flushing or salt water gargle.  Taking over-the-counter Mucinex.     PMH:  has a past medical history of Dyslipidemia (10/22/2020) and No significant past medical history.    He has no past medical history of Asthma or Type II or unspecified type diabetes mellitus without mention of complication, not stated as uncontrolled.  MEDS:   Current Outpatient Medications:     amoxicillin (AMOXIL) 500 MG Cap, Take 1 Capsule by mouth 2 times a day for 10 days., Disp: 20 Capsule, Rfl: 0    aspirin (ASA) 325 MG Tab, Take 325 mg by mouth every 6 hours as needed. (Patient not taking: Reported on 4/28/2022), Disp: , Rfl:     indomethacin (INDOCIN) 50 MG Cap, TAKE 1 CAPSULE BY MOUTH THREE TIMES DAILY WITH FOOD (Patient not taking: No sig reported), Disp: , Rfl:     meloxicam (MOBIC) 15 MG tablet, Take 1 Tab by mouth every day. (Patient not taking: Reported on 2/22/2021), Disp: 30 Tab, Rfl: 1  ALLERGIES: No Known Allergies  SURGHX: History reviewed. No pertinent surgical history.  SOCHX:  reports that he has quit smoking. His smoking use included cigarettes. He has a 0.40 pack-year smoking history. He has never used smokeless tobacco. He reports current alcohol use. He reports that he does not use drugs.  FH: Family history was reviewed, no pertinent findings to report      Review of Systems   Constitutional:  Negative for chills, fever and malaise/fatigue.   HENT:  Positive for congestion and sore throat. Negative for ear pain  "and sinus pain.    Eyes:  Negative for discharge and redness.   Respiratory:  Positive for cough. Negative for shortness of breath and wheezing.    Cardiovascular: Negative.    Gastrointestinal:  Negative for abdominal pain, constipation, diarrhea, nausea and vomiting.   Musculoskeletal:  Negative for myalgias and neck pain.   Skin:  Negative for itching and rash.   Neurological:  Negative for dizziness, weakness and headaches.   Endo/Heme/Allergies:  Negative for environmental allergies.   All other systems reviewed and are negative.           Objective     /80 (BP Location: Right arm, Patient Position: Sitting, BP Cuff Size: Adult)   Pulse 78   Temp 36.7 °C (98.1 °F) (Temporal)   Resp 16   Ht 1.803 m (5' 11\")   Wt 97.5 kg (215 lb)   SpO2 100%   BMI 29.99 kg/m²      Physical Exam  Vitals reviewed.   Constitutional:       General: He is awake. He is not in acute distress.     Appearance: Normal appearance. He is well-developed. He is not ill-appearing, toxic-appearing or diaphoretic.   HENT:      Head: Normocephalic.      Right Ear: Ear canal and external ear normal. A middle ear effusion is present.      Left Ear: Ear canal and external ear normal. A middle ear effusion is present.      Nose: Congestion and rhinorrhea present.      Mouth/Throat:      Lips: Pink.      Pharynx: Uvula midline. Pharyngeal swelling and posterior oropharyngeal erythema present. No oropharyngeal exudate or uvula swelling.      Tonsils: No tonsillar exudate or tonsillar abscesses. 1+ on the right. 1+ on the left.   Eyes:      Conjunctiva/sclera: Conjunctivae normal.      Pupils: Pupils are equal, round, and reactive to light.   Cardiovascular:      Rate and Rhythm: Normal rate.   Pulmonary:      Effort: Pulmonary effort is normal. No tachypnea, accessory muscle usage or respiratory distress.      Breath sounds: Normal breath sounds and air entry. No stridor, decreased air movement or transmitted upper airway sounds. No " decreased breath sounds, wheezing, rhonchi or rales.   Musculoskeletal:         General: Normal range of motion.      Cervical back: Normal range of motion and neck supple.   Skin:     General: Skin is warm and dry.   Neurological:      Mental Status: He is alert and oriented to person, place, and time.   Psychiatric:         Attention and Perception: Attention normal.         Mood and Affect: Mood normal.         Speech: Speech normal.         Behavior: Behavior normal. Behavior is cooperative.                           Assessment & Plan        1. Sore throat    - POCT Rapid Strep A    2. Strep pharyngitis    - amoxicillin (AMOXIL) 500 MG Cap; Take 1 Capsule by mouth 2 times a day for 10 days.  Dispense: 20 Capsule; Refill: 0         -Maintain hydration/water intake  -May use over the counter Ibuprofen/Tylenol as needed for any fever, body aches or throat pain  -May take long acting antihistamine for seasonal allergy symptoms as needed  -May use over the counter saline nasal spray for nasal congestion as needed  -May use over the counter Nasacort/Flonase for nasal congestion as needed   -May use throat lozenges for throat discomfort as needed   -Change toothbrush after 24 hrs of initiating antibiotics   -May gargle with salt water up to 4x/day as needed for throat discomfort (1 tsp salt dissolved in 1 cup warm water)  -May drink smoothies for nutrition if too painful to swallow solid foods  -Monitor for any sinus pain/pressure with sinus congestion with thick mucus production, sinus headache, cough, shortness of breath, fever- need re-evaluation

## 2023-01-25 ENCOUNTER — OFFICE VISIT (OUTPATIENT)
Dept: MEDICAL GROUP | Facility: PHYSICIAN GROUP | Age: 53
End: 2023-01-25
Payer: COMMERCIAL

## 2023-01-25 VITALS
RESPIRATION RATE: 16 BRPM | OXYGEN SATURATION: 98 % | BODY MASS INDEX: 31.64 KG/M2 | DIASTOLIC BLOOD PRESSURE: 78 MMHG | HEART RATE: 74 BPM | HEIGHT: 71 IN | SYSTOLIC BLOOD PRESSURE: 110 MMHG | TEMPERATURE: 98.3 F | WEIGHT: 226 LBS

## 2023-01-25 DIAGNOSIS — Z23 NEED FOR VACCINATION: ICD-10-CM

## 2023-01-25 DIAGNOSIS — Z76.89 ENCOUNTER TO ESTABLISH CARE WITH NEW DOCTOR: ICD-10-CM

## 2023-01-25 DIAGNOSIS — R73.03 PREDIABETES: ICD-10-CM

## 2023-01-25 DIAGNOSIS — E78.5 DYSLIPIDEMIA: ICD-10-CM

## 2023-01-25 DIAGNOSIS — Z00.00 WELL ADULT EXAM: ICD-10-CM

## 2023-01-25 DIAGNOSIS — E66.9 OBESITY (BMI 30-39.9): ICD-10-CM

## 2023-01-25 PROCEDURE — 90686 IIV4 VACC NO PRSV 0.5 ML IM: CPT | Performed by: INTERNAL MEDICINE

## 2023-01-25 PROCEDURE — 99214 OFFICE O/P EST MOD 30 MIN: CPT | Mod: 25 | Performed by: INTERNAL MEDICINE

## 2023-01-25 PROCEDURE — 90471 IMMUNIZATION ADMIN: CPT | Performed by: INTERNAL MEDICINE

## 2023-01-25 ASSESSMENT — PATIENT HEALTH QUESTIONNAIRE - PHQ9: CLINICAL INTERPRETATION OF PHQ2 SCORE: 0

## 2023-01-25 ASSESSMENT — FIBROSIS 4 INDEX: FIB4 SCORE: 0.62

## 2023-01-25 NOTE — ASSESSMENT & PLAN NOTE
Chronic ongoing condition.  The patient presently not on any therapy.  Lab test result discussed with the patient.  Recommend blood test to be done for follow-up.

## 2023-01-25 NOTE — ASSESSMENT & PLAN NOTE
This is a chronic ongoing condition.  Patient presently not on therapy.  Patient is due for lab test.

## 2023-01-25 NOTE — PROGRESS NOTES
"PRIMARY CARE CLINIC VISIT    Chief complaint:    Establish care with new doctor  Medical condition discussion  Requests lab test  Request influenza vaccine      History of Present Illness     Obesity (BMI 30-39.9)  Chronic condition.    Body mass index is 31.52 kg/m².   Brief discussion with the patient regarding diet, exercise, and lifestyle modification to help achieve and maintain healthy weight              Dyslipidemia  Chronic ongoing condition.  The patient presently not on any therapy.  Lab test result discussed with the patient.  Recommend blood test to be done for follow-up.    Prediabetes  This is a chronic ongoing condition.  Patient presently not on therapy.  Patient is due for lab test.    Need for vaccination  Patient presents today requesting influenza vaccine.    No current outpatient medications on file prior to visit.     No current facility-administered medications on file prior to visit.        Allergies: Patient has no known allergies.    ROS  As per HPI above. All other systems reviewed and negative.      Past Medical, Social, and Family history reviewed and updated in EPIC     Objective     /78 (BP Location: Left arm, Patient Position: Sitting, BP Cuff Size: Large adult)   Pulse 74   Temp 36.8 °C (98.3 °F) (Temporal)   Resp 16   Ht 1.803 m (5' 11\")   Wt 103 kg (226 lb)   SpO2 98%    Body mass index is 31.52 kg/m².    General: alert in no apparent distress.  Cardiovascular: regular rate and rhythm  Pulmonary: lungs : no wheezing   Gastrointestinal: BS present. No obvious mass noted        Lab Results   Component Value Date/Time    HBA1C 5.8 (H) 08/25/2022 07:57 AM    HBA1C 5.7 (H) 04/26/2022 07:32 AM    HBA1C 5.7 (H) 11/23/2021 07:09 AM       Lab Results   Component Value Date/Time    WBC 7.1 08/25/2022 07:57 AM    HEMOGLOBIN 14.7 08/25/2022 07:57 AM    HEMATOCRIT 45.4 08/25/2022 07:57 AM    MCV 90.6 08/25/2022 07:57 AM    PLATELETCT 297 08/25/2022 07:57 AM         Lab Results "   Component Value Date/Time    SODIUM 141 08/25/2022 07:57 AM    POTASSIUM 4.4 08/25/2022 07:57 AM    GLUCOSE 103 (H) 08/25/2022 07:57 AM    BUN 16 08/25/2022 07:57 AM    CREATININE 0.97 08/25/2022 07:57 AM       Lab Results   Component Value Date/Time    CHOLSTRLTOT 212 (H) 08/25/2022 07:57 AM     (H) 08/25/2022 07:57 AM    HDL 48 08/25/2022 07:57 AM    TRIGLYCERIDE 134 08/25/2022 07:57 AM       Lab Results   Component Value Date/Time    ALTSGPT 15 08/25/2022 07:57 AM             Assessment and Plan     1. Encounter to establish care with new doctor    2. Prediabetes  Chronic ongoing condition.  Patient not interested in taking a medication at this time.    Recommend diet and exercise and continue to monitor.      3. Dyslipidemia  Chronic condition.    Control unclear.  Lab tests ordered.  Recommend low-fat low-cholesterol diet.  Continue to monitor.    4. Obesity (BMI 30-39.9)  Chronic condition.  Uncontrolled.  Recommend healthy diet and exercise.  Encouraged weight loss.      5. Need for vaccination  - INFLUENZA VACCINE QUAD INJ (PF)    6. Well adult exam  Routine lab work ordered.  Recommend follow-up after lab test done.  - HEP C VIRUS ANTIBODY; Future  - HEMOGLOBIN A1C; Future  - Basic Metabolic Panel; Future  - Lipid Profile; Future  - PROSTATE SPECIFIC AG SCREENING; Future  - VITAMIN D,25 HYDROXY (DEFICIENCY); Future                          Please note that this dictation was created using voice recognition software. I have made every reasonable attempt to correct obvious errors, but I expect that there are errors of grammar and possibly content that I did not discover before finalizing the note.    Bharat Armstrong MD  Internal Medicine  [facility]

## 2023-01-25 NOTE — ASSESSMENT & PLAN NOTE
Chronic condition.    Body mass index is 31.52 kg/m².   Brief discussion with the patient regarding diet, exercise, and lifestyle modification to help achieve and maintain healthy weight

## 2023-07-03 ENCOUNTER — HOSPITAL ENCOUNTER (OUTPATIENT)
Dept: LAB | Facility: MEDICAL CENTER | Age: 53
End: 2023-07-03
Attending: INTERNAL MEDICINE
Payer: COMMERCIAL

## 2023-07-03 DIAGNOSIS — Z00.00 WELL ADULT EXAM: ICD-10-CM

## 2023-07-03 LAB
25(OH)D3 SERPL-MCNC: 19 NG/ML (ref 30–100)
ANION GAP SERPL CALC-SCNC: 11 MMOL/L (ref 7–16)
BUN SERPL-MCNC: 15 MG/DL (ref 8–22)
CALCIUM SERPL-MCNC: 9.1 MG/DL (ref 8.5–10.5)
CHLORIDE SERPL-SCNC: 106 MMOL/L (ref 96–112)
CHOLEST SERPL-MCNC: 166 MG/DL (ref 100–199)
CO2 SERPL-SCNC: 24 MMOL/L (ref 20–33)
CREAT SERPL-MCNC: 1.01 MG/DL (ref 0.5–1.4)
EST. AVERAGE GLUCOSE BLD GHB EST-MCNC: 114 MG/DL
FASTING STATUS PATIENT QL REPORTED: NORMAL
GFR SERPLBLD CREATININE-BSD FMLA CKD-EPI: 89 ML/MIN/1.73 M 2
GLUCOSE SERPL-MCNC: 113 MG/DL (ref 65–99)
HBA1C MFR BLD: 5.6 % (ref 4–5.6)
HCV AB SER QL: NORMAL
HDLC SERPL-MCNC: 35 MG/DL
LDLC SERPL CALC-MCNC: 92 MG/DL
POTASSIUM SERPL-SCNC: 4.6 MMOL/L (ref 3.6–5.5)
PSA SERPL-MCNC: 0.72 NG/ML (ref 0–4)
SODIUM SERPL-SCNC: 141 MMOL/L (ref 135–145)
TRIGL SERPL-MCNC: 197 MG/DL (ref 0–149)

## 2023-07-03 PROCEDURE — 80061 LIPID PANEL: CPT

## 2023-07-03 PROCEDURE — 80048 BASIC METABOLIC PNL TOTAL CA: CPT

## 2023-07-03 PROCEDURE — 82306 VITAMIN D 25 HYDROXY: CPT

## 2023-07-03 PROCEDURE — 86803 HEPATITIS C AB TEST: CPT

## 2023-07-03 PROCEDURE — 36415 COLL VENOUS BLD VENIPUNCTURE: CPT

## 2023-07-03 PROCEDURE — 84153 ASSAY OF PSA TOTAL: CPT

## 2023-07-03 PROCEDURE — 83036 HEMOGLOBIN GLYCOSYLATED A1C: CPT

## 2023-07-06 ENCOUNTER — OFFICE VISIT (OUTPATIENT)
Dept: MEDICAL GROUP | Facility: PHYSICIAN GROUP | Age: 53
End: 2023-07-06
Payer: COMMERCIAL

## 2023-07-06 VITALS
OXYGEN SATURATION: 98 % | SYSTOLIC BLOOD PRESSURE: 110 MMHG | HEART RATE: 70 BPM | DIASTOLIC BLOOD PRESSURE: 76 MMHG | HEIGHT: 71 IN | WEIGHT: 221.25 LBS | TEMPERATURE: 98.4 F | RESPIRATION RATE: 20 BRPM | BODY MASS INDEX: 30.98 KG/M2

## 2023-07-06 DIAGNOSIS — E55.9 VITAMIN D DEFICIENCY: ICD-10-CM

## 2023-07-06 DIAGNOSIS — E78.5 DYSLIPIDEMIA: ICD-10-CM

## 2023-07-06 DIAGNOSIS — E66.9 OBESITY (BMI 30-39.9): ICD-10-CM

## 2023-07-06 DIAGNOSIS — R73.03 PREDIABETES: ICD-10-CM

## 2023-07-06 PROCEDURE — 3074F SYST BP LT 130 MM HG: CPT | Performed by: INTERNAL MEDICINE

## 2023-07-06 PROCEDURE — 3078F DIAST BP <80 MM HG: CPT | Performed by: INTERNAL MEDICINE

## 2023-07-06 PROCEDURE — 99214 OFFICE O/P EST MOD 30 MIN: CPT | Performed by: INTERNAL MEDICINE

## 2023-07-06 RX ORDER — ONDANSETRON 4 MG/1
1 TABLET, FILM COATED ORAL EVERY 6 HOURS PRN
COMMUNITY
End: 2023-07-06

## 2023-07-06 RX ORDER — OXYCODONE HYDROCHLORIDE 5 MG/1
TABLET ORAL
COMMUNITY
End: 2023-07-06

## 2023-07-06 RX ORDER — TRAMADOL HYDROCHLORIDE 50 MG/1
TABLET ORAL
COMMUNITY
End: 2023-07-06

## 2023-07-06 ASSESSMENT — FIBROSIS 4 INDEX: FIB4 SCORE: 0.63

## 2023-07-06 NOTE — PROGRESS NOTES
PRIMARY CARE CLINIC VISIT    Chief complaint:    Follow-up hyperlipidemia  Prediabetes  Overweight  Vitamin D deficiency  Lab test results      History of Present Illness     Prediabetes  Chronic condition.  The patient is presently on diet therapy.  Recent lab test result discussed with the patient.  He is not interested in taking medication at this time.    Dyslipidemia  Chronic condition.  Recent lipid panel result discussed with the patient.  The total cholesterol and LDL have improved.  Triglyceride level is still high.    Obesity (BMI 30-39.9)  Chronic condition.    Counseling on health consequences related to obesity.  Discussed with the patient regarding diet, exercise, and lifestyle modification to help achieve and maintain healthy weight          Vitamin D deficiency  Chronic condition for the patient presently not taking vitamin D supplementation.  Lab test result discussed with the patient.    No current outpatient medications on file prior to visit.     No current facility-administered medications on file prior to visit.        Allergies: Patient has no known allergies.    No current Epic-ordered outpatient medications on file.     No current Epic-ordered facility-administered medications on file.       Past Medical History:   Diagnosis Date    Dyslipidemia 10/22/2020    No significant past medical history        Past Surgical History:   Procedure Laterality Date    FOOT SURGERY Left     2020       Family History   Problem Relation Age of Onset    No Known Problems Mother     Other Father         BPH    No Known Problems Sister     No Known Problems Brother     No Known Problems Brother        Social History     Tobacco Use   Smoking Status Former    Packs/day: 0.10    Years: 4.00    Pack years: 0.40    Types: Cigarettes   Smokeless Tobacco Never   Tobacco Comments    quit cigarettes at age 22   Vaping Use    Vaping Use: Never used       Social History     Substance and Sexual Activity   Alcohol Use Yes  "   Comment: Occasionally       Review of systems.  As per HPI above. All other systems reviewed and negative.      Past Medical, Social, and Family history reviewed and updated in EPIC     Objective     /76 (BP Location: Left arm, Patient Position: Sitting, BP Cuff Size: Adult)   Pulse 70   Temp 36.9 °C (98.4 °F) (Temporal)   Resp 20   Ht 1.803 m (5' 11\")   Wt 100 kg (221 lb 4 oz)   SpO2 98%    Body mass index is 30.86 kg/m².    General: alert in no apparent distress.  Cardiovascular: regular rate and rhythm  Pulmonary: lungs : no wheezing   Gastrointestinal: BS present. No obvious mass noted        Lab Results   Component Value Date/Time    HBA1C 5.6 07/03/2023 07:29 AM    HBA1C 5.8 (H) 08/25/2022 07:57 AM    HBA1C 5.7 (H) 04/26/2022 07:32 AM       Lab Results   Component Value Date/Time    WBC 7.1 08/25/2022 07:57 AM    HEMOGLOBIN 14.7 08/25/2022 07:57 AM    HEMATOCRIT 45.4 08/25/2022 07:57 AM    MCV 90.6 08/25/2022 07:57 AM    PLATELETCT 297 08/25/2022 07:57 AM         Lab Results   Component Value Date/Time    SODIUM 141 07/03/2023 07:29 AM    POTASSIUM 4.6 07/03/2023 07:29 AM    GLUCOSE 113 (H) 07/03/2023 07:29 AM    BUN 15 07/03/2023 07:29 AM    CREATININE 1.01 07/03/2023 07:29 AM       Lab Results   Component Value Date/Time    CHOLSTRLTOT 166 07/03/2023 07:29 AM    TRIGLYCERIDE 197 (H) 07/03/2023 07:29 AM    HDL 35 (A) 07/03/2023 07:29 AM    LDL 92 07/03/2023 07:29 AM       Lab Results   Component Value Date/Time    ALTSGPT 15 08/25/2022 07:57 AM             Assessment and Plan     1. Prediabetes  - HEMOGLOBIN A1C; Future  - Basic Metabolic Panel; Future  This is a chronic condition.  Stable.  Patient not interested in taking medication.  Recommend diet and exercise.  Continue to monitor.    2. Dyslipidemia  - Lipid Profile; Future  Chronic condition.  The lab test result discussed with the patient.  Triglyceride level was still mildly elevated.  Patient not interested in taking medication.  " Recommended diet and exercise.  Continue to monitor.    3. Vitamin D deficiency  - VITAMIN D,25 HYDROXY (DEFICIENCY); Future  Chronic condition.  Recommended patient to start vitamin D3 2000 unit daily.  Repeat lab test next lab draw.    4. Obesity (BMI 30-39.9)  Chronic condition.  The patient has lost approximately 5 pounds since her last visit.  The patient will try to lose more weight with weight goal of 200 pounds set      Follow-up in 6 months with labs prior.              Healthcare Maintenance       Health Maintenance Due   Topic Date Due    COVID-19 Vaccine (4 - Pfizer series) 02/08/2022               Please note that this dictation was created using voice recognition software. I have made every reasonable attempt to correct obvious errors, but I expect that there are errors of grammar and possibly content that I did not discover before finalizing the note.    Bharat Armstrong MD  Internal Medicine  [facility]

## 2023-07-06 NOTE — ASSESSMENT & PLAN NOTE
Chronic condition.  Recent lipid panel result discussed with the patient.  The total cholesterol and LDL have improved.  Triglyceride level is still high.   Yes

## 2023-07-06 NOTE — ASSESSMENT & PLAN NOTE
Chronic condition for the patient presently not taking vitamin D supplementation.  Lab test result discussed with the patient.

## 2023-07-06 NOTE — ASSESSMENT & PLAN NOTE
Chronic condition.  The patient is presently on diet therapy.  Recent lab test result discussed with the patient.  He is not interested in taking medication at this time.

## 2023-07-06 NOTE — ASSESSMENT & PLAN NOTE
Chronic condition.    Counseling on health consequences related to obesity.  Discussed with the patient regarding diet, exercise, and lifestyle modification to help achieve and maintain healthy weight

## 2024-01-20 ENCOUNTER — OFFICE VISIT (OUTPATIENT)
Dept: URGENT CARE | Facility: PHYSICIAN GROUP | Age: 54
End: 2024-01-20
Payer: COMMERCIAL

## 2024-01-20 VITALS
DIASTOLIC BLOOD PRESSURE: 102 MMHG | WEIGHT: 218.48 LBS | TEMPERATURE: 98.2 F | BODY MASS INDEX: 30.59 KG/M2 | HEIGHT: 71 IN | SYSTOLIC BLOOD PRESSURE: 132 MMHG | RESPIRATION RATE: 16 BRPM | HEART RATE: 92 BPM | OXYGEN SATURATION: 98 %

## 2024-01-20 DIAGNOSIS — S01.81XA CHIN LACERATION, INITIAL ENCOUNTER: ICD-10-CM

## 2024-01-20 PROCEDURE — 3080F DIAST BP >= 90 MM HG: CPT | Performed by: PHYSICIAN ASSISTANT

## 2024-01-20 PROCEDURE — 12011 RPR F/E/E/N/L/M 2.5 CM/<: CPT | Performed by: PHYSICIAN ASSISTANT

## 2024-01-20 PROCEDURE — 90471 IMMUNIZATION ADMIN: CPT | Performed by: PHYSICIAN ASSISTANT

## 2024-01-20 PROCEDURE — 3075F SYST BP GE 130 - 139MM HG: CPT | Performed by: PHYSICIAN ASSISTANT

## 2024-01-20 PROCEDURE — 90715 TDAP VACCINE 7 YRS/> IM: CPT | Performed by: PHYSICIAN ASSISTANT

## 2024-01-20 ASSESSMENT — ENCOUNTER SYMPTOMS
CHILLS: 0
FEVER: 0
DIARRHEA: 0
VOMITING: 0
CONSTIPATION: 0
NAUSEA: 0
ABDOMINAL PAIN: 0
MYALGIAS: 0
HEADACHES: 0
SHORTNESS OF BREATH: 0
EYE PAIN: 0
COUGH: 0
SORE THROAT: 0

## 2024-01-20 ASSESSMENT — FIBROSIS 4 INDEX: FIB4 SCORE: 0.63

## 2024-01-20 NOTE — PROCEDURES
Laceration Repair    Date/Time: 1/20/2024 3:42 PM    Performed by: James Arambula P.A.-C.  Authorized by: James Arambula P.A.-C.  Body area: head/neck  Location details: chin  Laceration length: 2 cm  Foreign bodies: no foreign bodies  Tendon involvement: none  Nerve involvement: none  Vascular damage: no  Anesthesia: local infiltration    Anesthesia:  Local Anesthetic: lidocaine 1% without epinephrine  Anesthetic total: 4 mL    Sedation:  Patient sedated: no    Preparation: Patient was prepped and draped in the usual sterile fashion.  Irrigation solution: Dilute chlorhexidine.  Irrigation method: syringe  Amount of cleaning: extensive  Debridement: none  Degree of undermining: none  Skin closure: 5-0 Prolene  Number of sutures: 3  Technique: simple  Approximation: close  Approximation difficulty: simple  Dressing: antibiotic ointment  Patient tolerance: patient tolerated the procedure well with no immediate complications

## 2024-01-20 NOTE — PROGRESS NOTES
"Subjective:   Elder Starks is a 53 y.o. male who presents for Facial Laceration (Laceration on chin ~1:45pm, pt was pushing table with glass top and slipped on a dent in the floor when he hit his chin against the metal part of the table. )      53-year-old male slipped while moving table cutting his chin on a protruding piece of metal.  No loss of consciousness.  He has no dental pain.  Presents for laceration repair.  Last tetanus 2016    Review of Systems   Constitutional:  Negative for chills and fever.   HENT:  Negative for congestion, ear pain and sore throat.    Eyes:  Negative for pain.   Respiratory:  Negative for cough and shortness of breath.    Cardiovascular:  Negative for chest pain.   Gastrointestinal:  Negative for abdominal pain, constipation, diarrhea, nausea and vomiting.   Genitourinary:  Negative for dysuria.   Musculoskeletal:  Negative for myalgias.   Skin:  Negative for rash.   Neurological:  Negative for headaches.       Medications, Allergies, and current problem list reviewed today in Epic.     Objective:     BP (!) 132/102 (BP Location: Left arm, Patient Position: Sitting, BP Cuff Size: Adult)   Pulse 92   Temp 36.8 °C (98.2 °F) (Temporal)   Resp 16   Ht 1.803 m (5' 11\")   Wt 99.1 kg (218 lb 7.6 oz)   SpO2 98%     Physical Exam  Vitals reviewed.   Constitutional:       Appearance: Normal appearance.   HENT:      Head: Normocephalic and atraumatic.      Right Ear: External ear normal.      Left Ear: External ear normal.      Nose: Nose normal.      Mouth/Throat:      Mouth: Mucous membranes are moist.   Eyes:      Conjunctiva/sclera: Conjunctivae normal.   Cardiovascular:      Rate and Rhythm: Normal rate.   Pulmonary:      Effort: Pulmonary effort is normal.   Skin:     General: Skin is warm and dry.      Capillary Refill: Capillary refill takes less than 2 seconds.      Comments: 2 cm curvilinear laceration to underside of chin, gapes with gentle pressure.  Noncontaminated.  No " neurovascular damage.   Neurological:      Mental Status: He is alert and oriented to person, place, and time.         Assessment/Plan:     Diagnosis and associated orders:     1. Chin laceration, initial encounter  Tdap =>9yo IM         Comments/MDM:     Simple interrupted Prolene sutures placed, return in 5 to 7 days for removal         Differential diagnosis, natural history, supportive care, and indications for immediate follow-up discussed.    Advised the patient to follow-up with the primary care physician for recheck, reevaluation, and consideration of further management.    Please note that this dictation was created using voice recognition software. I have made a reasonable attempt to correct obvious errors, but I expect that there are errors of grammar and possibly content that I did not discover before finalizing the note.    This note was electronically signed by James Arambula PA-C

## 2024-01-28 ENCOUNTER — OFFICE VISIT (OUTPATIENT)
Dept: URGENT CARE | Facility: PHYSICIAN GROUP | Age: 54
End: 2024-01-28
Payer: COMMERCIAL

## 2024-01-28 VITALS
HEIGHT: 71 IN | SYSTOLIC BLOOD PRESSURE: 130 MMHG | RESPIRATION RATE: 18 BRPM | BODY MASS INDEX: 30.71 KG/M2 | DIASTOLIC BLOOD PRESSURE: 62 MMHG | WEIGHT: 219.36 LBS | HEART RATE: 88 BPM | TEMPERATURE: 98.3 F | OXYGEN SATURATION: 96 %

## 2024-01-28 DIAGNOSIS — Z48.02 VISIT FOR SUTURE REMOVAL: ICD-10-CM

## 2024-01-28 PROBLEM — Z23 NEED FOR VACCINATION: Status: RESOLVED | Noted: 2023-01-25 | Resolved: 2024-01-28

## 2024-01-28 PROCEDURE — 99212 OFFICE O/P EST SF 10 MIN: CPT | Performed by: FAMILY MEDICINE

## 2024-01-28 PROCEDURE — 3078F DIAST BP <80 MM HG: CPT | Performed by: FAMILY MEDICINE

## 2024-01-28 PROCEDURE — 3075F SYST BP GE 130 - 139MM HG: CPT | Performed by: FAMILY MEDICINE

## 2024-01-28 ASSESSMENT — FIBROSIS 4 INDEX: FIB4 SCORE: 0.63

## 2024-01-28 NOTE — PROGRESS NOTES
"  Subjective:      53 y.o. male presents to urgent care for suture removal.  He was seen 1/20/2020 for laceration to his chin that he sustained while moving a glass tabletop which accidentally fell and broke.  During that urgent care visit he had 3 sutures placed.  He has been doing well since that time, pain is minimal.  He remains afebrile.    He denies any other questions or concerns at this time.    Current problem list, medication, and past medical/surgical history were reviewed in Epic.    ROS  See HPI     Objective:      /62 (BP Location: Left arm, Patient Position: Sitting, BP Cuff Size: Adult)   Pulse 88   Temp 36.8 °C (98.3 °F) (Temporal)   Resp 18   Ht 1.803 m (5' 11\")   Wt 99.5 kg (219 lb 5.7 oz)   SpO2 96%   BMI 30.59 kg/m²     Physical Exam  Constitutional:       General: He is not in acute distress.     Appearance: He is not diaphoretic.   Cardiovascular:      Rate and Rhythm: Normal rate and regular rhythm.      Heart sounds: Normal heart sounds.   Pulmonary:      Effort: Pulmonary effort is normal. No respiratory distress.      Breath sounds: Normal breath sounds.   Skin:     Comments: Appropriately healing laceration to his chin with 3 sutures in place   Neurological:      Mental Status: He is alert.   Psychiatric:         Mood and Affect: Affect normal.         Judgment: Judgment normal.       Assessment/Plan:     1. Visit for suture removal  3 sutures were removed.  This was well-tolerated.      Instructed to return to Urgent Care or nearest Emergency Department if symptoms fail to improve, for any change in condition, further concerns, or new concerning symptoms. Patient states understanding of the plan of care and discharge instructions.    Gita Israel M.D.   "

## 2024-07-01 ENCOUNTER — OFFICE VISIT (OUTPATIENT)
Dept: URGENT CARE | Facility: PHYSICIAN GROUP | Age: 54
End: 2024-07-01
Payer: COMMERCIAL

## 2024-07-01 VITALS
WEIGHT: 213.63 LBS | HEART RATE: 66 BPM | RESPIRATION RATE: 15 BRPM | SYSTOLIC BLOOD PRESSURE: 106 MMHG | DIASTOLIC BLOOD PRESSURE: 74 MMHG | BODY MASS INDEX: 29.91 KG/M2 | TEMPERATURE: 97.9 F | HEIGHT: 71 IN | OXYGEN SATURATION: 97 %

## 2024-07-01 DIAGNOSIS — S39.012A LOW BACK STRAIN, INITIAL ENCOUNTER: ICD-10-CM

## 2024-07-01 PROCEDURE — 99213 OFFICE O/P EST LOW 20 MIN: CPT

## 2024-07-01 PROCEDURE — 3074F SYST BP LT 130 MM HG: CPT

## 2024-07-01 PROCEDURE — 3078F DIAST BP <80 MM HG: CPT

## 2024-07-01 RX ORDER — CYCLOBENZAPRINE HCL 5 MG
5-10 TABLET ORAL
Qty: 15 TABLET | Refills: 0 | Status: SHIPPED | OUTPATIENT
Start: 2024-07-01

## 2024-07-01 RX ORDER — IBUPROFEN 800 MG/1
800 TABLET ORAL EVERY 8 HOURS PRN
Qty: 30 TABLET | Refills: 0 | Status: SHIPPED | OUTPATIENT
Start: 2024-07-01

## 2024-07-01 RX ORDER — KETOROLAC TROMETHAMINE 30 MG/ML
30 INJECTION, SOLUTION INTRAMUSCULAR; INTRAVENOUS ONCE
Status: COMPLETED | OUTPATIENT
Start: 2024-07-01 | End: 2024-07-01

## 2024-07-01 RX ADMIN — KETOROLAC TROMETHAMINE 30 MG: 30 INJECTION, SOLUTION INTRAMUSCULAR; INTRAVENOUS at 18:36

## 2024-07-01 ASSESSMENT — ENCOUNTER SYMPTOMS
TINGLING: 0
COUGH: 0
ABDOMINAL PAIN: 0
FEVER: 0
WEAKNESS: 0
CHILLS: 0
HEADACHES: 0
DIARRHEA: 0
BACK PAIN: 1
NAUSEA: 0
FALLS: 0
NUMBNESS: 0
VOMITING: 0
SHORTNESS OF BREATH: 0
BOWEL INCONTINENCE: 0
PERIANAL NUMBNESS: 0
MYALGIAS: 0
SORE THROAT: 0

## 2024-07-01 ASSESSMENT — FIBROSIS 4 INDEX: FIB4 SCORE: 0.65

## 2024-12-15 ENCOUNTER — OFFICE VISIT (OUTPATIENT)
Dept: URGENT CARE | Facility: PHYSICIAN GROUP | Age: 54
End: 2024-12-15
Payer: COMMERCIAL

## 2024-12-15 VITALS
SYSTOLIC BLOOD PRESSURE: 116 MMHG | DIASTOLIC BLOOD PRESSURE: 82 MMHG | HEIGHT: 71 IN | OXYGEN SATURATION: 18 % | TEMPERATURE: 99 F | HEART RATE: 79 BPM | WEIGHT: 214.8 LBS | BODY MASS INDEX: 30.07 KG/M2 | RESPIRATION RATE: 18 BRPM

## 2024-12-15 DIAGNOSIS — R05.1 ACUTE COUGH: ICD-10-CM

## 2024-12-15 DIAGNOSIS — J20.9 ACUTE BRONCHITIS, UNSPECIFIED ORGANISM: ICD-10-CM

## 2024-12-15 DIAGNOSIS — R50.9 FEVER, UNSPECIFIED: ICD-10-CM

## 2024-12-15 LAB
FLUAV RNA SPEC QL NAA+PROBE: NEGATIVE
FLUBV RNA SPEC QL NAA+PROBE: NEGATIVE
RSV RNA SPEC QL NAA+PROBE: NEGATIVE
S PYO DNA SPEC NAA+PROBE: NOT DETECTED
SARS-COV-2 RNA RESP QL NAA+PROBE: NEGATIVE

## 2024-12-15 PROCEDURE — 87651 STREP A DNA AMP PROBE: CPT

## 2024-12-15 PROCEDURE — 0241U POCT CEPHEID COV-2, FLU A/B, RSV - PCR: CPT

## 2024-12-15 PROCEDURE — 3079F DIAST BP 80-89 MM HG: CPT

## 2024-12-15 PROCEDURE — 99213 OFFICE O/P EST LOW 20 MIN: CPT

## 2024-12-15 PROCEDURE — 3074F SYST BP LT 130 MM HG: CPT

## 2024-12-15 RX ORDER — PREDNISONE 10 MG/1
20 TABLET ORAL DAILY
Qty: 6 TABLET | Refills: 0 | Status: SHIPPED | OUTPATIENT
Start: 2024-12-15 | End: 2024-12-18

## 2024-12-15 RX ORDER — BENZONATATE 100 MG/1
100 CAPSULE ORAL 3 TIMES DAILY PRN
Qty: 30 CAPSULE | Refills: 0 | Status: SHIPPED | OUTPATIENT
Start: 2024-12-15 | End: 2024-12-25

## 2024-12-15 RX ORDER — ACETAMINOPHEN 500 MG
500-1000 TABLET ORAL EVERY 6 HOURS PRN
COMMUNITY

## 2024-12-15 ASSESSMENT — ENCOUNTER SYMPTOMS
HEADACHES: 1
VOMITING: 0
NAUSEA: 0
MYALGIAS: 1
HEMOPTYSIS: 0
COUGH: 1
BACK PAIN: 0
STRIDOR: 0
RHINORRHEA: 1
WHEEZING: 0
EYE DISCHARGE: 0
SHORTNESS OF BREATH: 0
CHILLS: 1
SORE THROAT: 0
SPUTUM PRODUCTION: 0
HEARTBURN: 0
WEIGHT LOSS: 0
SINUS PAIN: 0
FEVER: 1
SWEATS: 0

## 2024-12-15 ASSESSMENT — COPD QUESTIONNAIRES: COPD: 0

## 2024-12-15 NOTE — PROGRESS NOTES
Subjective:   Elder Starks is a 54 y.o. male who presents for Cough (Chest congestion, headache, sinus pressure X 3 day. General Fatigue)      Patient presents with complaints of cough, congestion, sinus pressure, headache, fever and chills for the last 48 hours.  Patient states that symptoms came on suddenly he has no sick contacts that he is aware of.  He denies any production to his cough, states that most pressing symptom to him though is that his cough is nonstop.  States he has been taking over-the-counter Mucinex but has not afforded him any relief.  He denies any chest pain, shortness of breath, wheezing, stridor, difficulty breathing or any throat pain.    Cough  This is a new problem. The current episode started in the past 7 days. The problem has been unchanged. The cough is Non-productive. Associated symptoms include chills, a fever, headaches, myalgias, nasal congestion, postnasal drip and rhinorrhea. Pertinent negatives include no chest pain, ear congestion, ear pain, heartburn, hemoptysis, rash, sore throat, shortness of breath, sweats, weight loss or wheezing. Nothing aggravates the symptoms. He has tried OTC cough suppressant for the symptoms. The treatment provided no relief. There is no history of asthma, bronchiectasis, bronchitis, COPD, emphysema, environmental allergies or pneumonia.       Review of Systems   Constitutional:  Positive for chills and fever. Negative for weight loss.   HENT:  Positive for congestion, postnasal drip and rhinorrhea. Negative for ear discharge, ear pain, sinus pain and sore throat.    Eyes:  Negative for discharge.   Respiratory:  Positive for cough. Negative for hemoptysis, sputum production, shortness of breath, wheezing and stridor.    Cardiovascular:  Negative for chest pain.   Gastrointestinal:  Negative for heartburn, nausea and vomiting.   Genitourinary: Negative.    Musculoskeletal:  Positive for myalgias. Negative for back pain.   Skin:  Negative for  "rash.   Neurological:  Positive for headaches.   Endo/Heme/Allergies:  Negative for environmental allergies.     Refer to HPI for additional details.    During this visit, appropriate PPE was worn, and hand hygiene was performed.    PMH:  has a past medical history of Dyslipidemia (10/22/2020) and No significant past medical history.    He has no past medical history of Asthma or Type II or unspecified type diabetes mellitus without mention of complication, not stated as uncontrolled.    MEDS:   Current Outpatient Medications:     acetaminophen (TYLENOL) 500 MG Tab, Take 500-1,000 mg by mouth every 6 hours as needed., Disp: , Rfl:     predniSONE (DELTASONE) 10 MG Tab, Take 2 Tablets by mouth every day for 3 days., Disp: 6 Tablet, Rfl: 0    benzonatate (TESSALON) 100 MG Cap, Take 1 Capsule by mouth 3 times a day as needed for Cough for up to 10 days., Disp: 30 Capsule, Rfl: 0    cyclobenzaprine (FLEXERIL) 5 mg tablet, Take 1-2 Tablets by mouth at bedtime as needed for Muscle Spasms. (Patient not taking: Reported on 12/15/2024), Disp: 15 Tablet, Rfl: 0    ibuprofen (MOTRIN) 800 MG Tab, Take 1 Tablet by mouth every 8 hours as needed for Moderate Pain. (Patient not taking: Reported on 12/15/2024), Disp: 30 Tablet, Rfl: 0    ALLERGIES: No Known Allergies  SURGHX:   Past Surgical History:   Procedure Laterality Date    FOOT SURGERY Left     2020     SOCHX:  reports that he has quit smoking. His smoking use included cigarettes. He has a 0.4 pack-year smoking history. He has never used smokeless tobacco. He reports current alcohol use. He reports that he does not use drugs.    FH: Per HPI as applicable/pertinent.    Medications, Allergies, and current problem list reviewed today in Epic.     Objective:     /82 (BP Location: Left arm, Patient Position: Sitting, BP Cuff Size: Adult long)   Pulse 79   Temp 37.2 °C (99 °F) (Temporal)   Resp 18   Ht 1.803 m (5' 11\")   Wt 97.4 kg (214 lb 12.8 oz)   SpO2 (!) 18% "     Physical Exam  Constitutional:       General: He is not in acute distress.     Appearance: Normal appearance.   HENT:      Head: Normocephalic.      Right Ear: Tympanic membrane, ear canal and external ear normal.      Left Ear: Tympanic membrane, ear canal and external ear normal.      Nose: Congestion and rhinorrhea present.      Mouth/Throat:      Mouth: Mucous membranes are dry.      Pharynx: Posterior oropharyngeal erythema present. No oropharyngeal exudate.   Eyes:      Conjunctiva/sclera: Conjunctivae normal.   Cardiovascular:      Rate and Rhythm: Normal rate.   Pulmonary:      Effort: Pulmonary effort is normal. No respiratory distress.      Breath sounds: Normal breath sounds. No stridor. No wheezing, rhonchi or rales.   Chest:      Chest wall: No tenderness.   Musculoskeletal:      Cervical back: Normal range of motion. No tenderness.   Lymphadenopathy:      Cervical: No cervical adenopathy.   Neurological:      Mental Status: He is alert.         Assessment/Plan:     Diagnosis and associated orders:     1. Acute cough  - predniSONE (DELTASONE) 10 MG Tab; Take 2 Tablets by mouth every day for 3 days.  Dispense: 6 Tablet; Refill: 0  - benzonatate (TESSALON) 100 MG Cap; Take 1 Capsule by mouth 3 times a day as needed for Cough for up to 10 days.  Dispense: 30 Capsule; Refill: 0  - POCT CEPHEID COV-2, FLU A/B, RSV - PCR    2. Acute bronchitis, unspecified organism    3. Fever, unspecified  - POCT CEPHEID GROUP A STREP - PCR  - POCT CEPHEID COV-2, FLU A/B, RSV - PCR    Other orders  - acetaminophen (TYLENOL) 500 MG Tab; Take 500-1,000 mg by mouth every 6 hours as needed.     Comments/MDM:     Patient history and physical exam are consistent with acute bronchitis with concomitant nonproductive cough and fever.  Patient presents well in clinic no red flag or worrisome conditions noted on physical exam or endorsed by patient.  Does have a nonproductive cough though pulmonary exam showed no adventitious  breath sounds.  Given short history and duration of symptoms we will test for viral and strep etiologies  In clinic strep swab negative  In clinic viral swab negative  Outpatient management will consist of copious fluids, staggered Tylenol and ibuprofen, 3-day once daily prednisone, benzonatate as needed for cough suppression, rest, strict hand hygiene, monitor symptoms  Follow up in 3-5 days if no improvement in symptoms           Differential diagnosis, natural history, supportive care, and indications for immediate follow-up discussed.    Advised the patient to follow-up with the primary care physician for recheck, reevaluation, and consideration of further management.    Please note that this dictation was created using voice recognition software. I have made a reasonable attempt to correct obvious errors, but I expect that there are errors of grammar and possibly content that I did not discover before finalizing the note.    This note was electronically signed by NATALIIA Curtis

## 2025-01-21 ENCOUNTER — HOSPITAL ENCOUNTER (OUTPATIENT)
Dept: LAB | Facility: MEDICAL CENTER | Age: 55
End: 2025-01-21
Attending: INTERNAL MEDICINE
Payer: COMMERCIAL

## 2025-01-21 ENCOUNTER — OFFICE VISIT (OUTPATIENT)
Dept: MEDICAL GROUP | Facility: PHYSICIAN GROUP | Age: 55
End: 2025-01-21
Payer: COMMERCIAL

## 2025-01-21 VITALS
BODY MASS INDEX: 31.47 KG/M2 | DIASTOLIC BLOOD PRESSURE: 82 MMHG | SYSTOLIC BLOOD PRESSURE: 118 MMHG | HEIGHT: 71 IN | WEIGHT: 224.8 LBS | OXYGEN SATURATION: 96 % | HEART RATE: 61 BPM | TEMPERATURE: 97.6 F

## 2025-01-21 DIAGNOSIS — Z23 NEED FOR VACCINATION: ICD-10-CM

## 2025-01-21 DIAGNOSIS — E66.3 OVERWEIGHT: ICD-10-CM

## 2025-01-21 DIAGNOSIS — Z00.00 ANNUAL PHYSICAL EXAM: ICD-10-CM

## 2025-01-21 DIAGNOSIS — E55.9 VITAMIN D DEFICIENCY: Chronic | ICD-10-CM

## 2025-01-21 DIAGNOSIS — R73.03 PREDIABETES: Chronic | ICD-10-CM

## 2025-01-21 DIAGNOSIS — E78.5 DYSLIPIDEMIA: Chronic | ICD-10-CM

## 2025-01-21 LAB
25(OH)D3 SERPL-MCNC: 27 NG/ML (ref 30–100)
ALT SERPL-CCNC: 24 U/L (ref 2–50)
ANION GAP SERPL CALC-SCNC: 10 MMOL/L (ref 7–16)
BASOPHILS # BLD AUTO: 1.1 % (ref 0–1.8)
BASOPHILS # BLD: 0.07 K/UL (ref 0–0.12)
BUN SERPL-MCNC: 16 MG/DL (ref 8–22)
CALCIUM SERPL-MCNC: 9.1 MG/DL (ref 8.5–10.5)
CHLORIDE SERPL-SCNC: 105 MMOL/L (ref 96–112)
CHOLEST SERPL-MCNC: 197 MG/DL (ref 100–199)
CO2 SERPL-SCNC: 25 MMOL/L (ref 20–33)
CREAT SERPL-MCNC: 1.09 MG/DL (ref 0.5–1.4)
CREAT UR-MCNC: 112.99 MG/DL
EOSINOPHIL # BLD AUTO: 0.12 K/UL (ref 0–0.51)
EOSINOPHIL NFR BLD: 1.9 % (ref 0–6.9)
ERYTHROCYTE [DISTWIDTH] IN BLOOD BY AUTOMATED COUNT: 46.6 FL (ref 35.9–50)
EST. AVERAGE GLUCOSE BLD GHB EST-MCNC: 128 MG/DL
FASTING STATUS PATIENT QL REPORTED: NORMAL
GFR SERPLBLD CREATININE-BSD FMLA CKD-EPI: 81 ML/MIN/1.73 M 2
GLUCOSE SERPL-MCNC: 122 MG/DL (ref 65–99)
HBA1C MFR BLD: 6.1 % (ref 4–5.6)
HCT VFR BLD AUTO: 48.2 % (ref 42–52)
HDLC SERPL-MCNC: 45 MG/DL
HGB BLD-MCNC: 15.7 G/DL (ref 14–18)
HIV 1+2 AB+HIV1 P24 AG SERPL QL IA: NORMAL
IMM GRANULOCYTES # BLD AUTO: 0.08 K/UL (ref 0–0.11)
IMM GRANULOCYTES NFR BLD AUTO: 1.3 % (ref 0–0.9)
LDLC SERPL CALC-MCNC: 128 MG/DL
LYMPHOCYTES # BLD AUTO: 2 K/UL (ref 1–4.8)
LYMPHOCYTES NFR BLD: 31.5 % (ref 22–41)
MCH RBC QN AUTO: 28.5 PG (ref 27–33)
MCHC RBC AUTO-ENTMCNC: 32.6 G/DL (ref 32.3–36.5)
MCV RBC AUTO: 87.5 FL (ref 81.4–97.8)
MICROALBUMIN UR-MCNC: <1.2 MG/DL
MICROALBUMIN/CREAT UR: NORMAL MG/G (ref 0–30)
MONOCYTES # BLD AUTO: 0.59 K/UL (ref 0–0.85)
MONOCYTES NFR BLD AUTO: 9.3 % (ref 0–13.4)
NEUTROPHILS # BLD AUTO: 3.48 K/UL (ref 1.82–7.42)
NEUTROPHILS NFR BLD: 54.9 % (ref 44–72)
NRBC # BLD AUTO: 0 K/UL
NRBC BLD-RTO: 0 /100 WBC (ref 0–0.2)
PLATELET # BLD AUTO: 302 K/UL (ref 164–446)
PMV BLD AUTO: 10.3 FL (ref 9–12.9)
POTASSIUM SERPL-SCNC: 4.9 MMOL/L (ref 3.6–5.5)
PSA SERPL DL<=0.01 NG/ML-MCNC: 0.69 NG/ML (ref 0–4)
RBC # BLD AUTO: 5.51 M/UL (ref 4.7–6.1)
SODIUM SERPL-SCNC: 140 MMOL/L (ref 135–145)
TRIGL SERPL-MCNC: 122 MG/DL (ref 0–149)
TSH SERPL-ACNC: 1.46 UIU/ML (ref 0.35–5.5)
WBC # BLD AUTO: 6.3 K/UL (ref 4.8–10.8)

## 2025-01-21 PROCEDURE — 84153 ASSAY OF PSA TOTAL: CPT

## 2025-01-21 PROCEDURE — 90471 IMMUNIZATION ADMIN: CPT | Performed by: INTERNAL MEDICINE

## 2025-01-21 PROCEDURE — 3079F DIAST BP 80-89 MM HG: CPT | Performed by: INTERNAL MEDICINE

## 2025-01-21 PROCEDURE — 85025 COMPLETE CBC W/AUTO DIFF WBC: CPT

## 2025-01-21 PROCEDURE — 82570 ASSAY OF URINE CREATININE: CPT

## 2025-01-21 PROCEDURE — 99396 PREV VISIT EST AGE 40-64: CPT | Mod: 25 | Performed by: INTERNAL MEDICINE

## 2025-01-21 PROCEDURE — 82043 UR ALBUMIN QUANTITATIVE: CPT

## 2025-01-21 PROCEDURE — 84460 ALANINE AMINO (ALT) (SGPT): CPT

## 2025-01-21 PROCEDURE — 83036 HEMOGLOBIN GLYCOSYLATED A1C: CPT

## 2025-01-21 PROCEDURE — 36415 COLL VENOUS BLD VENIPUNCTURE: CPT

## 2025-01-21 PROCEDURE — 80061 LIPID PANEL: CPT

## 2025-01-21 PROCEDURE — 90656 IIV3 VACC NO PRSV 0.5 ML IM: CPT | Performed by: INTERNAL MEDICINE

## 2025-01-21 PROCEDURE — 84443 ASSAY THYROID STIM HORMONE: CPT

## 2025-01-21 PROCEDURE — 82306 VITAMIN D 25 HYDROXY: CPT

## 2025-01-21 PROCEDURE — 87389 HIV-1 AG W/HIV-1&-2 AB AG IA: CPT

## 2025-01-21 PROCEDURE — 80048 BASIC METABOLIC PNL TOTAL CA: CPT

## 2025-01-21 PROCEDURE — 3074F SYST BP LT 130 MM HG: CPT | Performed by: INTERNAL MEDICINE

## 2025-01-21 RX ORDER — VITAMIN B COMPLEX
2000 TABLET ORAL DAILY
COMMUNITY

## 2025-01-21 ASSESSMENT — PATIENT HEALTH QUESTIONNAIRE - PHQ9: CLINICAL INTERPRETATION OF PHQ2 SCORE: 0

## 2025-01-21 NOTE — ASSESSMENT & PLAN NOTE
This is a chronic condition.  The patient is presently on diet therapy.  Patient is due for lab test

## 2025-01-21 NOTE — ASSESSMENT & PLAN NOTE
Body mass index is 31.35 kg/m².     Chronic condition.  Recommend pt to follow a healthy , well balance diet  Pt to continue with regular exercise activity and to maintain ideal weight.

## 2025-01-21 NOTE — PROGRESS NOTES
PRIMARY CARE CLINIC VISIT    Chief complaint:    Pt is here for Annual Exam      History of Present Illness     Prediabetes  This is a chronic condition.  The patient is presently on diet therapy.  Patient is due for lab test    Dyslipidemia  Chronic condition.  Patient currently not on therapy.  Patient is due for blood test to check lipid panel.  Patient currently asymptomatic    Vitamin D deficiency  Chronic condition.  Patient presently taking vitamin D.  Lab test ordered for follow-up.    Overweight  Body mass index is 31.35 kg/m².     Chronic condition.  Recommend pt to follow a healthy , well balance diet  Pt to continue with regular exercise activity and to maintain ideal weight.          Allergies: Patient has no known allergies.    Current Outpatient Medications Ordered in Epic   Medication Sig Dispense Refill    vitamin D3 (CHOLECALCIFEROL) 1000 Unit (25 mcg) Tab Take 2,000 Units by mouth every day.       No current [facility]-ordered facility-administered medications on file.       Past Medical History:   Diagnosis Date    Dyslipidemia 10/22/2020    No significant past medical history        Past Surgical History:   Procedure Laterality Date    FOOT SURGERY Left     2020       Family History   Problem Relation Age of Onset    No Known Problems Mother     Other Father         BPH    No Known Problems Sister     No Known Problems Brother     No Known Problems Brother        Social History     Tobacco Use   Smoking Status Former    Current packs/day: 0.10    Average packs/day: 0.1 packs/day for 4.0 years (0.4 ttl pk-yrs)    Types: Cigarettes   Smokeless Tobacco Never   Tobacco Comments    quit cigarettes at age 22       Social History     Substance and Sexual Activity   Alcohol Use Yes    Comment: Occasionally       Review of systems:  As per HPI above. All other systems reviewed and negative.      Past Medical, Social, and Family history reviewed and updated in EPIC     Objective     Lab Results   Component  "Value Date/Time    HBA1C 5.6 07/03/2023 07:29 AM    HBA1C 5.8 (H) 08/25/2022 07:57 AM    HBA1C 5.7 (H) 04/26/2022 07:32 AM       Lab Results   Component Value Date/Time    WBC 7.1 08/25/2022 07:57 AM    HEMOGLOBIN 14.7 08/25/2022 07:57 AM    HEMATOCRIT 45.4 08/25/2022 07:57 AM    MCV 90.6 08/25/2022 07:57 AM    PLATELETCT 297 08/25/2022 07:57 AM         Lab Results   Component Value Date/Time    SODIUM 141 07/03/2023 07:29 AM    POTASSIUM 4.6 07/03/2023 07:29 AM    GLUCOSE 113 (H) 07/03/2023 07:29 AM    BUN 15 07/03/2023 07:29 AM    CREATININE 1.01 07/03/2023 07:29 AM       Lab Results   Component Value Date/Time    CHOLSTRLTOT 166 07/03/2023 07:29 AM    LDL 92 07/03/2023 07:29 AM    HDL 35 (A) 07/03/2023 07:29 AM    TRIGLYCERIDE 197 (H) 07/03/2023 07:29 AM       Lab Results   Component Value Date/Time    ALTSGPT 15 08/25/2022 07:57 AM       -------------------------------------------------------------------------------------------    /82 (BP Location: Right arm, Patient Position: Sitting, BP Cuff Size: Adult)   Pulse 61   Temp 36.4 °C (97.6 °F) (Temporal)   Ht 1.803 m (5' 11\")   Wt 102 kg (224 lb 12.8 oz)   SpO2 96%   Body mass index is 31.35 kg/m².    General: alert in no apparent distress.  Cardiovascular: regular rate and rhythm  Pulmonary: lungs : no wheezing   Gastrointestinal: BS present.   Sinus exam unremarkable  Oropharynx no exudate   normal male genitalia.  No urethral discharge.  Testes descended.  Rectal no active bleeding.  Prostate no obvious mass  Cranial nerve II to XII grossly intact  Assessment and Plan     1. Annual physical exam  - HEMOGLOBIN A1C; Future  - ALANINE AMINO-TRANS; Future  - Basic Metabolic Panel; Future  - CBC WITH DIFFERENTIAL; Future  - Lipid Profile; Future  - PROSTATE SPECIFIC AG SCREENING; Future  - TSH; Future  - MICROALBUMIN CREAT RATIO URINE; Future  - VITAMIN D,25 HYDROXY (DEFICIENCY); Future  - HIV AG/AB COMBO ASSAY SCREENING; Future  Routine lab requested.  " For the patient to follow-up after a few days    2. Prediabetes  Chronic condition.  Current status unclear.  Lab test ordered to check A1c.  Recommend diet and lifestyle modification.  Continue to monitor    3. Dyslipidemia  Chronic condition.  Current status unclear.  Lipid panel ordered.  Recommend low-fat low-cholesterol diet    4. Vitamin D deficiency  This is a chronic condition.  Current status unclear.  Blood test ordered to check vitamin D level.  Patient asymptomatic.  For now continue vitamin D3 supplementation 2000 unit daily    5. Overweight  Chronic condition.  Uncontrolled.  Recommend diet lifestyle modification.  Encouraged patient to lose weight    6. Need for vaccination  - INFLUENZA VACCINE TRI INJ (PF)    Other orders  - vitamin D3 (CHOLECALCIFEROL) 1000 Unit (25 mcg) Tab; Take 2,000 Units by mouth every day.                  ANTICIPATORY GUIDANCE  Patient Counseling:  -Cholesterol screening. Fasting lipid panel  -Diabetes screening. Fasting blood sugar  -Diet: advised lean meats, fruits, vegetables, whole grains  -Discussed Healthful lifestyle measures. Pt to avoid tobacco, ETOH, and drug use.  -Pt to continue with regular exercise/walking activities  -Advised sun protection, sunscreen use  -Injury prevention: discussed safety seat belts  -Discussed preventive immunizations  -Recommend patient to schedule a yearly eye examination and dental exam                     Please note that this dictation was created using voice recognition software. I have made every reasonable attempt to correct obvious errors, but I expect that there are errors of grammar and possibly content that I did not discover before finalizing the note.    Bharat Armstrong MD  Internal Medicine  [facility]

## 2025-01-21 NOTE — ASSESSMENT & PLAN NOTE
Chronic condition.  Patient currently not on therapy.  Patient is due for blood test to check lipid panel.  Patient currently asymptomatic